# Patient Record
Sex: FEMALE | Race: WHITE
[De-identification: names, ages, dates, MRNs, and addresses within clinical notes are randomized per-mention and may not be internally consistent; named-entity substitution may affect disease eponyms.]

---

## 2022-03-19 PROBLEM — E78.00 ELEVATED LDL CHOLESTEROL LEVEL: Status: ACTIVE | Noted: 2020-09-29

## 2022-03-19 PROBLEM — E03.9 PRIMARY HYPOTHYROIDISM: Status: ACTIVE | Noted: 2020-09-28

## 2022-03-20 PROBLEM — Z85.068 HISTORY OF DUODENAL CANCER: Status: ACTIVE | Noted: 2021-04-07

## 2022-10-15 DIAGNOSIS — E03.9 PRIMARY HYPOTHYROIDISM: ICD-10-CM

## 2022-10-15 DIAGNOSIS — M81.0 AGE-RELATED OSTEOPOROSIS WITHOUT CURRENT PATHOLOGICAL FRACTURE: ICD-10-CM

## 2022-10-15 DIAGNOSIS — E78.00 PURE HYPERCHOLESTEROLEMIA, UNSPECIFIED: Primary | ICD-10-CM

## 2022-10-18 DIAGNOSIS — E03.9 HYPOTHYROIDISM, UNSPECIFIED: ICD-10-CM

## 2022-10-19 DIAGNOSIS — M81.0 AGE-RELATED OSTEOPOROSIS WITHOUT CURRENT PATHOLOGICAL FRACTURE: ICD-10-CM

## 2022-10-19 DIAGNOSIS — E78.00 PURE HYPERCHOLESTEROLEMIA, UNSPECIFIED: ICD-10-CM

## 2022-10-19 DIAGNOSIS — E03.9 PRIMARY HYPOTHYROIDISM: ICD-10-CM

## 2022-10-20 LAB
25(OH)D3 SERPL-MCNC: 72.5 NG/ML (ref 30–100)
ALBUMIN SERPL-MCNC: 4 G/DL (ref 3.2–4.6)
ALBUMIN/GLOB SERPL: 1.5 {RATIO} (ref 0.4–1.6)
ALP SERPL-CCNC: 53 U/L (ref 50–136)
ALT SERPL-CCNC: 31 U/L (ref 12–65)
ANION GAP SERPL CALC-SCNC: 6 MMOL/L (ref 2–11)
AST SERPL-CCNC: 21 U/L (ref 15–37)
BASOPHILS # BLD: 0 K/UL (ref 0–0.2)
BASOPHILS NFR BLD: 1 % (ref 0–2)
BILIRUB SERPL-MCNC: 0.4 MG/DL (ref 0.2–1.1)
BUN SERPL-MCNC: 8 MG/DL (ref 8–23)
CALCIUM SERPL-MCNC: 8.9 MG/DL (ref 8.3–10.4)
CHLORIDE SERPL-SCNC: 105 MMOL/L (ref 101–110)
CHOLEST SERPL-MCNC: 280 MG/DL
CO2 SERPL-SCNC: 26 MMOL/L (ref 21–32)
CREAT SERPL-MCNC: 0.4 MG/DL (ref 0.6–1)
DIFFERENTIAL METHOD BLD: ABNORMAL
EOSINOPHIL # BLD: 0.1 K/UL (ref 0–0.8)
EOSINOPHIL NFR BLD: 3 % (ref 0.5–7.8)
ERYTHROCYTE [DISTWIDTH] IN BLOOD BY AUTOMATED COUNT: 14.8 % (ref 11.9–14.6)
GLOBULIN SER CALC-MCNC: 2.6 G/DL (ref 2.8–4.5)
GLUCOSE SERPL-MCNC: 92 MG/DL (ref 65–100)
HCT VFR BLD AUTO: 35.2 % (ref 35.8–46.3)
HDLC SERPL-MCNC: 83 MG/DL (ref 40–60)
HDLC SERPL: 3.4 {RATIO}
HGB BLD-MCNC: 11 G/DL (ref 11.7–15.4)
IMM GRANULOCYTES # BLD AUTO: 0 K/UL (ref 0–0.5)
IMM GRANULOCYTES NFR BLD AUTO: 0 % (ref 0–5)
LDLC SERPL CALC-MCNC: 187.2 MG/DL
LYMPHOCYTES # BLD: 1.4 K/UL (ref 0.5–4.6)
LYMPHOCYTES NFR BLD: 52 % (ref 13–44)
MCH RBC QN AUTO: 30.1 PG (ref 26.1–32.9)
MCHC RBC AUTO-ENTMCNC: 31.3 G/DL (ref 31.4–35)
MCV RBC AUTO: 96.2 FL (ref 82–102)
MONOCYTES # BLD: 0.3 K/UL (ref 0.1–1.3)
MONOCYTES NFR BLD: 10 % (ref 4–12)
NEUTS SEG # BLD: 1 K/UL (ref 1.7–8.2)
NEUTS SEG NFR BLD: 35 % (ref 43–78)
NRBC # BLD: 0 K/UL (ref 0–0.2)
PLATELET # BLD AUTO: 289 K/UL (ref 150–450)
PMV BLD AUTO: 10 FL (ref 9.4–12.3)
POTASSIUM SERPL-SCNC: 4.2 MMOL/L (ref 3.5–5.1)
PROT SERPL-MCNC: 6.6 G/DL (ref 6.3–8.2)
RBC # BLD AUTO: 3.66 M/UL (ref 4.05–5.2)
SODIUM SERPL-SCNC: 137 MMOL/L (ref 133–143)
TRIGL SERPL-MCNC: 49 MG/DL (ref 35–150)
TSH, 3RD GENERATION: 2.58 UIU/ML (ref 0.36–3.74)
VLDLC SERPL CALC-MCNC: 9.8 MG/DL (ref 6–23)
WBC # BLD AUTO: 2.7 K/UL (ref 4.3–11.1)

## 2022-10-24 ENCOUNTER — OFFICE VISIT (OUTPATIENT)
Dept: FAMILY MEDICINE CLINIC | Facility: CLINIC | Age: 72
End: 2022-10-24
Payer: MEDICARE

## 2022-10-24 VITALS
HEIGHT: 65 IN | SYSTOLIC BLOOD PRESSURE: 108 MMHG | OXYGEN SATURATION: 98 % | BODY MASS INDEX: 23.49 KG/M2 | WEIGHT: 141 LBS | HEART RATE: 72 BPM | DIASTOLIC BLOOD PRESSURE: 60 MMHG | RESPIRATION RATE: 16 BRPM

## 2022-10-24 DIAGNOSIS — D70.9 NEUTROPENIA, UNSPECIFIED TYPE (HCC): ICD-10-CM

## 2022-10-24 DIAGNOSIS — E03.9 PRIMARY HYPOTHYROIDISM: ICD-10-CM

## 2022-10-24 DIAGNOSIS — E78.00 PURE HYPERCHOLESTEROLEMIA, UNSPECIFIED: Primary | ICD-10-CM

## 2022-10-24 PROCEDURE — G8399 PT W/DXA RESULTS DOCUMENT: HCPCS | Performed by: FAMILY MEDICINE

## 2022-10-24 PROCEDURE — G8420 CALC BMI NORM PARAMETERS: HCPCS | Performed by: FAMILY MEDICINE

## 2022-10-24 PROCEDURE — 99214 OFFICE O/P EST MOD 30 MIN: CPT | Performed by: FAMILY MEDICINE

## 2022-10-24 PROCEDURE — 1090F PRES/ABSN URINE INCON ASSESS: CPT | Performed by: FAMILY MEDICINE

## 2022-10-24 PROCEDURE — 3017F COLORECTAL CA SCREEN DOC REV: CPT | Performed by: FAMILY MEDICINE

## 2022-10-24 PROCEDURE — 1123F ACP DISCUSS/DSCN MKR DOCD: CPT | Performed by: FAMILY MEDICINE

## 2022-10-24 PROCEDURE — G8427 DOCREV CUR MEDS BY ELIG CLIN: HCPCS | Performed by: FAMILY MEDICINE

## 2022-10-24 PROCEDURE — 1036F TOBACCO NON-USER: CPT | Performed by: FAMILY MEDICINE

## 2022-10-24 PROCEDURE — G8484 FLU IMMUNIZE NO ADMIN: HCPCS | Performed by: FAMILY MEDICINE

## 2022-10-24 RX ORDER — AMOXICILLIN 250 MG
CAPSULE ORAL DAILY
COMMUNITY

## 2022-10-24 RX ORDER — BLACK COHOSH ROOT EXTRACT 80 MG
3 CAPSULE ORAL DAILY
COMMUNITY

## 2022-10-24 RX ORDER — LIOTHYRONINE SODIUM 5 UG/1
TABLET ORAL
Qty: 180 TABLET | Refills: 1 | Status: SHIPPED | OUTPATIENT
Start: 2022-10-24

## 2022-10-24 RX ORDER — LEVOTHYROXINE SODIUM 0.05 MG/1
50 TABLET ORAL
Qty: 90 TABLET | Refills: 1 | Status: SHIPPED | OUTPATIENT
Start: 2022-10-24

## 2022-10-24 RX ORDER — ASCORBIC ACID 500 MG
500 TABLET ORAL DAILY
COMMUNITY

## 2022-10-24 RX ORDER — LIOTHYRONINE SODIUM 5 UG/1
TABLET ORAL
Qty: 180 TABLET | Refills: 1 | OUTPATIENT
Start: 2022-10-24

## 2022-10-24 ASSESSMENT — ENCOUNTER SYMPTOMS
VOMITING: 0
NAUSEA: 0
SHORTNESS OF BREATH: 0
BLOOD IN STOOL: 0
ABDOMINAL PAIN: 0
COUGH: 0

## 2022-10-24 NOTE — PROGRESS NOTES
[facility],[address], DO  [address], [address] [facility] [address]  Ph No:  [phone]  Fax:  [phone]        Assessment/Plan:   Darlene Luke was seen today for thyroid problem and cholesterol problem. Diagnoses and all orders for this visit:    Pure hypercholesterolemia, unspecified  Uncontrolled. Advise following Mediterranean style diet as I am concerned that the high fat diet she is following is leading to the worsening cholesterol. Write her with information.  -     CBC with Auto Differential; Future  -     Comprehensive Metabolic Panel; Future  -     Lipid Panel; Future  -     TSH; Future    Primary hypothyroidism  TSH therapeutic. Continue liothyronine and levothyroxine doses. -     CBC with Auto Differential; Future  -     Comprehensive Metabolic Panel; Future  -     Lipid Panel; Future  -     TSH; Future  -     liothyronine (CYTOMEL) 5 MCG tablet; TAKE 2 TABLETS BY MOUTH EVERY DAY  -     levothyroxine (SYNTHROID) 50 MCG tablet; Take 1 tablet by mouth every morning (before breakfast)    Neutropenia, unspecified type ([address]  New problem. Continue to monitor. Her WBCs have been on the low end of normal but not this low in the recent past.  Continue to monitor trend. Discussed with patient.  -     CBC with Auto Differential; Future  -     Comprehensive Metabolic Panel; Future  -     Lipid Panel; Future  -     TSH; Future        Labs:  No results found for this visit on 10/24/22.     Orders Only on 10/19/2022   Component Date Value Ref Range Status    Vit D, 25-Hydroxy 10/19/2022 72.5  30.0 - 100.0 ng/mL Final    Sodium 10/19/2022 137  133 - 143 mmol/L Final    Potassium 10/19/2022 4.2  3.5 - 5.1 mmol/L Final    Chloride 10/19/2022 105  101 - 110 mmol/L Final    CO2 10/19/2022 26  21 - 32 mmol/L Final    Anion Gap 10/19/2022 6  2 - 11 mmol/L Final    Glucose 10/19/2022 92  65 - 100 mg/dL Final    BUN 10/19/2022 8  8 - 23 MG/DL Final    Creatinine 10/19/2022 0.40 (A)  0.6 - 1.0 MG/DL Final    Est, Glom Filt Rate 10/19/2022 >60  >60 ml/min/1.73m2 Final    Comment:   Pediatric calculator link: Orin.at. org/professionals/kdoqi/gfr_calculatorped    Effective Oct 3, 2022    These results are not intended for use in patients <25years of age. eGFR results are calculated without a race factor using  the 2021 CKD-EPI equation. Careful clinical correlation is recommended, particularly when comparing to results calculated using previous equations. The CKD-EPI equation is less accurate in patients with extremes of muscle mass, extra-renal metabolism of creatinine, excessive creatine ingestion, or following therapy that affects renal tubular secretion.       Calcium 10/19/2022 8.9  8.3 - 10.4 MG/DL Final    Total Bilirubin 10/19/2022 0.4  0.2 - 1.1 MG/DL Final    ALT 10/19/2022 31  12 - 65 U/L Final    AST 10/19/2022 21  15 - 37 U/L Final    Alk Phosphatase 10/19/2022 53  50 - 136 U/L Final    Total Protein 10/19/2022 6.6  6.3 - 8.2 g/dL Final    Albumin 10/19/2022 4.0  3.2 - 4.6 g/dL Final    Globulin 10/19/2022 2.6 (A)  2.8 - 4.5 g/dL Final    Albumin/Globulin Ratio 10/19/2022 1.5  0.4 - 1.6   Final    WBC 10/19/2022 2.7 (A)  4.3 - 11.1 K/uL Final    RBC 10/19/2022 3.66 (A)  4.05 - 5.2 M/uL Final    Hemoglobin 10/19/2022 11.0 (A)  11.7 - 15.4 g/dL Final    Hematocrit 10/19/2022 35.2 (A)  35.8 - 46.3 % Final    MCV 10/19/2022 96.2  82 - 102 FL Final    MCH 10/19/2022 30.1  26.1 - 32.9 PG Final    MCHC 10/19/2022 31.3 (A)  31.4 - 35.0 g/dL Final    RDW 10/19/2022 14.8 (A)  11.9 - 14.6 % Final    Platelets 42/52/4685 289  150 - 450 K/uL Final    MPV 10/19/2022 10.0  9.4 - 12.3 FL Final    nRBC 10/19/2022 0.00  0.0 - 0.2 K/uL Final    **Note: Absolute NRBC parameter is now reported with Hemogram**    Differential Type 10/19/2022 AUTOMATED    Final    Seg Neutrophils 10/19/2022 35 (A)  43 - 78 % Final    Lymphocytes 10/19/2022 52 (A)  13 - 44 % Final    Monocytes 10/19/2022 10  4.0 - 12.0 % Final    Eosinophils % 10/19/2022 3  0.5 - 7.8 % Final    Basophils 10/19/2022 1  0.0 - 2.0 % Final    Immature Granulocytes 10/19/2022 0  0.0 - 5.0 % Final    Segs Absolute 10/19/2022 1.0 (A)  1.7 - 8.2 K/UL Final    Absolute Lymph # 10/19/2022 1.4  0.5 - 4.6 K/UL Final    Absolute Mono # 10/19/2022 0.3  0.1 - 1.3 K/UL Final    Absolute Eos # 10/19/2022 0.1  0.0 - 0.8 K/UL Final    Basophils Absolute 10/19/2022 0.0  0.0 - 0.2 K/UL Final    Absolute Immature Granulocyte 10/19/2022 0.0  0.0 - 0.5 K/UL Final    Cholesterol, Total 10/19/2022 280 (A)  <200 MG/DL Final    Comment: Borderline High: 200-239 mg/dL  High: Greater than or equal to 240 mg/dL      Triglycerides 10/19/2022 49  35 - 150 MG/DL Final    Comment: Borderline High: 150-199 mg/dL, High: 200-499 mg/dL  Very High: Greater than or equal to 500 mg/dL      HDL 10/19/2022 83 (A)  40 - 60 MG/DL Final    LDL Calculated 10/19/2022 187.2 (A)  <100 MG/DL Final    Comment: Near Optimal: 100-129 mg/dL  Borderline High: 130-159, High: 160-189 mg/dL  Very High: Greater than or equal to 190 mg/dL      VLDL Cholesterol Calculated 10/19/2022 9.8  6.0 - 23.0 MG/DL Final    Chol/HDL Ratio 10/19/2022 3.4    Final    TSH, 3RD GENERATION 10/19/2022 2.580  0.358 - 3.740 uIU/mL Final           Della Trevizo is a 67 y.o. female who is seen for evaluation of   Chief Complaint   Patient presents with    Thyroid Problem    Cholesterol Problem       HPI:   She is here today to follow-up chronic issues. She has been following a high fat, low carb diet. She had labs prior to her appointment today. She is not interested in mammogram or cholesterol-lowering medications. She is tolerating her thyroid medications well and will need a refill. She not interested in any vaccines. She is looking forward to an upcoming trip to [place] for a friend's granddaughters wedding. She also recently had a good visit in [place] for family reunion.   She does note that she was not eating healthy on that trip. Review of Systems:  Review of Systems   Constitutional:  Negative for chills, fever and unexpected weight change. Respiratory:  Negative for cough and shortness of breath. Cardiovascular:  Negative for chest pain and leg swelling. Gastrointestinal:  Negative for abdominal pain, blood in stool, nausea and vomiting. Psychiatric/Behavioral:  The patient is not nervous/anxious. 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    S/P whipple    Lichen sclerosus     OAB (overactive bladder)     Osteoporosis     Primary hypothyroidism        Past Surgical History:   Procedure Laterality Date    HEMORRHOID SURGERY  2020    OTHER SURGICAL HISTORY  2010    whipple procedure        Current Outpatient Medications   Medication Sig Dispense Refill    Probiotic Product (PROBIOTIC-10 PO) Take by mouth      Cobalamin Combinations (B-12) 100-5000 MCG SUBL Place under the tongue daily      Omega 3-6-9 Fatty Acids (OMEGA 3-6-9 COMPLEX) CAPS Take 3 capsules by mouth daily      vitamin C (ASCORBIC ACID) 500 MG tablet Take 500 mg by mouth daily      liothyronine (CYTOMEL) 5 MCG tablet TAKE 2 TABLETS BY MOUTH EVERY  tablet 1    levothyroxine (SYNTHROID) 50 MCG tablet Take 1 tablet by mouth every morning (before breakfast) 90 tablet 1    LYCOPENE PO Take by mouth as needed      vitamin D3 (CHOLECALCIFEROL) 125 MCG (5000 UT) TABS tablet Take by mouth daily      Coenzyme Q10 10 MG CAPS Take by mouth      magnesium oxide (MAG-OX) 400 (240 Mg) MG tablet Take 400 mg by mouth daily      melatonin 3 MG TABS tablet Take by mouth      vitamin E 400 UNIT capsule Take by mouth daily       No current facility-administered medications for this visit.        Immunization History   Administered Date(s) Administered    Tdap (Boostrix, Adacel) 01/01/2011             Vitals:    Vitals:    10/24/22 1018   BP: 108/60   Site: Left Upper Arm   Position: Sitting Pulse: 72   Resp: 16   SpO2: 98%   Weight: 141 lb (64 kg)   Height: 5' 5\" (1.651 m)          Physical Exam:  Physical Exam  Vitals reviewed. Constitutional:       Appearance: Normal appearance. HENT:      Head: Normocephalic and atraumatic. Cardiovascular:      Rate and Rhythm: Normal rate and regular rhythm. Heart sounds: No murmur heard. Pulmonary:      Effort: Pulmonary effort is normal. No respiratory distress. Breath sounds: Normal breath sounds. No wheezing or rhonchi. Abdominal:      General: There is no distension. Palpations: There is no mass. Tenderness: There is no abdominal tenderness. There is no right CVA tenderness, left CVA tenderness, guarding or rebound. Hernia: No hernia is present. Musculoskeletal:      Cervical back: Neck supple. Right lower leg: No edema. Left lower leg: No edema. Lymphadenopathy:      Cervical: No cervical adenopathy. Skin:     General: Skin is warm and dry. Neurological:      Mental Status: She is alert. Psychiatric:         Mood and Affect: Mood normal.         Behavior: Behavior normal.           Bere Jones DO    This note was generated using Dragon voice recognition software.   There may be medical errors due to computer generated translation

## 2023-02-09 ENCOUNTER — OFFICE VISIT (OUTPATIENT)
Dept: FAMILY MEDICINE CLINIC | Facility: CLINIC | Age: 73
End: 2023-02-09
Payer: MEDICARE

## 2023-02-09 ENCOUNTER — TELEPHONE (OUTPATIENT)
Dept: FAMILY MEDICINE CLINIC | Facility: CLINIC | Age: 73
End: 2023-02-09

## 2023-02-09 VITALS
WEIGHT: 145.4 LBS | BODY MASS INDEX: 24.22 KG/M2 | TEMPERATURE: 98.3 F | DIASTOLIC BLOOD PRESSURE: 64 MMHG | OXYGEN SATURATION: 96 % | SYSTOLIC BLOOD PRESSURE: 116 MMHG | RESPIRATION RATE: 16 BRPM | HEART RATE: 71 BPM | HEIGHT: 65 IN

## 2023-02-09 DIAGNOSIS — J02.9 SORE THROAT: Primary | ICD-10-CM

## 2023-02-09 LAB
GROUP A STREP ANTIGEN, POC: NEGATIVE
VALID INTERNAL CONTROL, POC: YES

## 2023-02-09 PROCEDURE — G8399 PT W/DXA RESULTS DOCUMENT: HCPCS | Performed by: FAMILY MEDICINE

## 2023-02-09 PROCEDURE — 1090F PRES/ABSN URINE INCON ASSESS: CPT | Performed by: FAMILY MEDICINE

## 2023-02-09 PROCEDURE — G8420 CALC BMI NORM PARAMETERS: HCPCS | Performed by: FAMILY MEDICINE

## 2023-02-09 PROCEDURE — 87880 STREP A ASSAY W/OPTIC: CPT | Performed by: FAMILY MEDICINE

## 2023-02-09 PROCEDURE — 1123F ACP DISCUSS/DSCN MKR DOCD: CPT | Performed by: FAMILY MEDICINE

## 2023-02-09 PROCEDURE — 99213 OFFICE O/P EST LOW 20 MIN: CPT | Performed by: FAMILY MEDICINE

## 2023-02-09 PROCEDURE — 1036F TOBACCO NON-USER: CPT | Performed by: FAMILY MEDICINE

## 2023-02-09 PROCEDURE — G8427 DOCREV CUR MEDS BY ELIG CLIN: HCPCS | Performed by: FAMILY MEDICINE

## 2023-02-09 PROCEDURE — 3017F COLORECTAL CA SCREEN DOC REV: CPT | Performed by: FAMILY MEDICINE

## 2023-02-09 PROCEDURE — G8484 FLU IMMUNIZE NO ADMIN: HCPCS | Performed by: FAMILY MEDICINE

## 2023-02-09 ASSESSMENT — ENCOUNTER SYMPTOMS
DIARRHEA: 0
VOMITING: 0
SHORTNESS OF BREATH: 0
TROUBLE SWALLOWING: 1
COUGH: 0
SORE THROAT: 1
RHINORRHEA: 0
CONSTIPATION: 0
TROUBLE SWALLOWING: 0
SINUS PAIN: 0
VOICE CHANGE: 0
NAUSEA: 0
SINUS PRESSURE: 0

## 2023-02-09 NOTE — PROGRESS NOTES
[address]  [address] [facility] [address]  Ph No:  [phone]  Fax:  [phone]        Assessment/Plan:   Isela Chua was seen today for pharyngitis. Diagnoses and all orders for this visit:    Sore throat. Viral etiology. Rapid Strep test ordered just incase to rule out potential infection; awaiting results. Patient instructed to use magic mouthwash 4 times daily to help her tolerate the pain associated with her sore throat. Continue managing symptoms conservatively by drinking warm fluids and increasing overall fluid intake. If symptoms worsen/do not get better patient can contact [address] for further evaluation.  -     AMB POC RAPID STREP A  -     Magic Mouthwash (MIRACLE MOUTHWASH); Swish and spit 5 mLs 4 times daily as needed for Irritation      Labs:  Results for orders placed or performed in visit on 02/09/23   AMB POC RAPID STREP A   Result Value Ref Range    Valid Internal Control, POC yes     Group A Strep Antigen, POC Negative Negative       Orders Only on 10/19/2022   Component Date Value Ref Range Status    Vit D, 25-Hydroxy 10/19/2022 72.5  30.0 - 100.0 ng/mL Final    Sodium 10/19/2022 137  133 - 143 mmol/L Final    Potassium 10/19/2022 4.2  3.5 - 5.1 mmol/L Final    Chloride 10/19/2022 105  101 - 110 mmol/L Final    CO2 10/19/2022 26  21 - 32 mmol/L Final    Anion Gap 10/19/2022 6  2 - 11 mmol/L Final    Glucose 10/19/2022 92  65 - 100 mg/dL Final    BUN 10/19/2022 8  8 - 23 MG/DL Final    Creatinine 10/19/2022 0.40 (A)  0.6 - 1.0 MG/DL Final    Est, Glom Filt Rate 10/19/2022 >60  >60 ml/min/1.73m2 Final    Comment:   Pediatric calculator link: Orin.at. org/professionals/kdoqi/gfr_calculatorped    Effective Oct 3, 2022    These results are not intended for use in patients <25years of age. eGFR results are calculated without a race factor using  the 2021 CKD-EPI equation.  Careful clinical correlation is recommended, particularly when comparing to results calculated using previous equations. The CKD-EPI equation is less accurate in patients with extremes of muscle mass, extra-renal metabolism of creatinine, excessive creatine ingestion, or following therapy that affects renal tubular secretion.       Calcium 10/19/2022 8.9  8.3 - 10.4 MG/DL Final    Total Bilirubin 10/19/2022 0.4  0.2 - 1.1 MG/DL Final    ALT 10/19/2022 31  12 - 65 U/L Final    AST 10/19/2022 21  15 - 37 U/L Final    Alk Phosphatase 10/19/2022 53  50 - 136 U/L Final    Total Protein 10/19/2022 6.6  6.3 - 8.2 g/dL Final    Albumin 10/19/2022 4.0  3.2 - 4.6 g/dL Final    Globulin 10/19/2022 2.6 (A)  2.8 - 4.5 g/dL Final    Albumin/Globulin Ratio 10/19/2022 1.5  0.4 - 1.6   Final    WBC 10/19/2022 2.7 (A)  4.3 - 11.1 K/uL Final    RBC 10/19/2022 3.66 (A)  4.05 - 5.2 M/uL Final    Hemoglobin 10/19/2022 11.0 (A)  11.7 - 15.4 g/dL Final    Hematocrit 10/19/2022 35.2 (A)  35.8 - 46.3 % Final    MCV 10/19/2022 96.2  82 - 102 FL Final    MCH 10/19/2022 30.1  26.1 - 32.9 PG Final    MCHC 10/19/2022 31.3 (A)  31.4 - 35.0 g/dL Final    RDW 10/19/2022 14.8 (A)  11.9 - 14.6 % Final    Platelets 69/03/1225 289  150 - 450 K/uL Final    MPV 10/19/2022 10.0  9.4 - 12.3 FL Final    nRBC 10/19/2022 0.00  0.0 - 0.2 K/uL Final    **Note: Absolute NRBC parameter is now reported with Hemogram**    Differential Type 10/19/2022 AUTOMATED    Final    Seg Neutrophils 10/19/2022 35 (A)  43 - 78 % Final    Lymphocytes 10/19/2022 52 (A)  13 - 44 % Final    Monocytes 10/19/2022 10  4.0 - 12.0 % Final    Eosinophils % 10/19/2022 3  0.5 - 7.8 % Final    Basophils 10/19/2022 1  0.0 - 2.0 % Final    Immature Granulocytes 10/19/2022 0  0.0 - 5.0 % Final    Segs Absolute 10/19/2022 1.0 (A)  1.7 - 8.2 K/UL Final    Absolute Lymph # 10/19/2022 1.4  0.5 - 4.6 K/UL Final    Absolute Mono # 10/19/2022 0.3  0.1 - 1.3 K/UL Final    Absolute Eos # 10/19/2022 0.1  0.0 - 0.8 K/UL Final    Basophils Absolute 10/19/2022 0.0  0.0 - 0.2 K/UL Final    Absolute Immature Granulocyte 10/19/2022 0.0  0.0 - 0.5 K/UL Final    Cholesterol, Total 10/19/2022 280 (A)  <200 MG/DL Final    Comment: Borderline High: 200-239 mg/dL  High: Greater than or equal to 240 mg/dL      Triglycerides 10/19/2022 49  35 - 150 MG/DL Final    Comment: Borderline High: 150-199 mg/dL, High: 200-499 mg/dL  Very High: Greater than or equal to 500 mg/dL      HDL 10/19/2022 83 (A)  40 - 60 MG/DL Final    LDL Calculated 10/19/2022 187.2 (A)  <100 MG/DL Final    Comment: Near Optimal: 100-129 mg/dL  Borderline High: 130-159, High: 160-189 mg/dL  Very High: Greater than or equal to 190 mg/dL      VLDL Cholesterol Calculated 10/19/2022 9.8  6.0 - 23.0 MG/DL Final    Chol/HDL Ratio 10/19/2022 3.4    Final    TSH, 3RD GENERATION 10/19/2022 2.580  0.358 - 3.740 uIU/mL Final           Beena Altamirano is a 67 y.o. female who is seen for evaluation of   Chief Complaint   Patient presents with    Pharyngitis     Sore throat for about 3 days. Pt denies fever, body aches, fatigue, N/D/V, congestion. HPI:   Patient is a 67 yr old female who presents today with complaints of sore throat. She reports the symptoms came on rapidly 3 days ago and that she has no sick contacts. She states the soreness is generalized and not localized to a particular side of her throat. She has not taken a COVID test or a flu test at home. She mentions tenderness under her jaw bone, gum swelling/tenderness, and that her tongue is sensitive to hot liquids which causes a stinging sensation. She has tried colloidal silver, black elderberry syrup, and gargling with warm salt water in order to combat her sore throat. All of these methods only gave her temporary relief. Review of Systems:  Review of Systems   Constitutional:  Negative for chills, fatigue and fever. HENT:  Positive for sore throat and trouble swallowing.  Negative for congestion, postnasal drip, rhinorrhea, sinus pressure and sinus pain. Respiratory:  Negative for cough and shortness of breath. Cardiovascular:  Negative for chest pain and palpitations. Gastrointestinal:  Negative for constipation, diarrhea, nausea and vomiting. Neurological:  Negative for light-headedness and headaches. 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    S/P whipple    Lichen sclerosus     OAB (overactive bladder)     Osteoporosis     Primary hypothyroidism        Past Surgical History:   Procedure Laterality Date    HEMORRHOID SURGERY  2020    OTHER SURGICAL HISTORY  2010    whipple procedure        Current Outpatient Medications   Medication Sig Dispense Refill    Magic Mouthwash (MIRACLE MOUTHWASH) Swish and spit 5 mLs 4 times daily as needed for Irritation 100 mL 0    Probiotic Product (PROBIOTIC-10 PO) Take by mouth      Cobalamin Combinations (B-12) 100-5000 MCG SUBL Place under the tongue daily      Omega 3-6-9 Fatty Acids (OMEGA 3-6-9 COMPLEX) CAPS Take 3 capsules by mouth daily      vitamin C (ASCORBIC ACID) 500 MG tablet Take 500 mg by mouth daily      liothyronine (CYTOMEL) 5 MCG tablet TAKE 2 TABLETS BY MOUTH EVERY  tablet 1    levothyroxine (SYNTHROID) 50 MCG tablet Take 1 tablet by mouth every morning (before breakfast) 90 tablet 1    LYCOPENE PO Take by mouth as needed      vitamin D3 (CHOLECALCIFEROL) 125 MCG (5000 UT) TABS tablet Take by mouth daily      Coenzyme Q10 10 MG CAPS Take by mouth      magnesium oxide (MAG-OX) 400 (240 Mg) MG tablet Take 400 mg by mouth daily      melatonin 3 MG TABS tablet Take by mouth      vitamin E 400 UNIT capsule Take by mouth daily       No current facility-administered medications for this visit.        Immunization History   Administered Date(s) Administered    Tdap (Boostrix, Adacel) 01/01/2011             Vitals:    Vitals:    02/09/23 1147   BP: 116/64   Site: Left Upper Arm   Position: Sitting   Pulse: 71   Resp: 16 Temp: 98.3 °F (36.8 °C)   TempSrc: Oral   SpO2: 96%   Weight: 145 lb 6.4 oz (66 kg)   Height: 5' 5\" (1.651 m)          Physical Exam:  Physical Exam  Constitutional:       Appearance: Normal appearance. She is normal weight. She is not ill-appearing. HENT:      Right Ear: Tympanic membrane, ear canal and external ear normal.      Left Ear: Tympanic membrane, ear canal and external ear normal.      Nose: Nose normal. No congestion or rhinorrhea. Mouth/Throat:      Mouth: Mucous membranes are moist.      Pharynx: Posterior oropharyngeal erythema present. No oropharyngeal exudate. Cardiovascular:      Rate and Rhythm: Normal rate and regular rhythm. Pulses: Normal pulses. Heart sounds: Normal heart sounds. No murmur heard. No friction rub. No gallop. Pulmonary:      Breath sounds: Normal breath sounds. No stridor. No wheezing, rhonchi or rales. Abdominal:      Palpations: Abdomen is soft. Tenderness: There is no abdominal tenderness. There is no guarding. Musculoskeletal:         General: Tenderness (Bilateral Lower Extremity) present. Cervical back: Tenderness present. Lymphadenopathy:      Cervical: Cervical adenopathy present. Neurological:      Mental Status: She is alert. Benjy Talbot    This note was generated using Dragon voice recognition software.   There may be medical errors due to computer generated translation

## 2023-02-09 NOTE — PROGRESS NOTES
[address] [facility],[address], DO  [address] [facility] [address]  Ph No:  [phone]  Fax:  [phone]        Assessment/Plan:   Cortez Stack was seen today for pharyngitis. Diagnoses and all orders for this visit:    Sore throat. Viral etiology. Rapid strep negative. Exam inconsistent with strep throat. Prescribing Magic mouthwash to use for pain. -     AMB POC RAPID STREP A  -     Magic Mouthwash (MIRACLE MOUTHWASH); Swish and spit 5 mLs 4 times daily as needed for Irritation      Labs:  Results for orders placed or performed in visit on 02/09/23   AMB POC RAPID STREP A   Result Value Ref Range    Valid Internal Control, POC yes     Group A Strep Antigen, POC Negative Negative               Katelyn Diamond is a 67 y.o. female who is seen for evaluation of   Chief Complaint   Patient presents with    Pharyngitis     Sore throat for about 3 days. Pt denies fever, body aches, fatigue, N/D/V, congestion. HPI:  She is here today with 3 days of sore throat. No neck pain. She does not feel ill. No sick contacts. She feels that her gums are tender. She is up-to-date on her dental exam.  She also feels that her tongue is sensitive. She has history of taking iron and B12. Iron was stopped previously due to high iron level. She has tried colloidal silver, black elderberry syrup, and gargling with warm salt water in order to combat her sore throat. All of these methods only gave her temporary relief. Review of Systems:  Review of Systems   Constitutional:  Negative for chills, fatigue and fever. HENT:  Positive for sore throat. Negative for congestion, postnasal drip, rhinorrhea, sinus pressure, sinus pain, trouble swallowing and voice change. Respiratory:  Negative for cough and shortness of breath. Cardiovascular:  Negative for chest pain and palpitations. Gastrointestinal:  Negative for constipation, diarrhea, nausea and vomiting.    Neurological: Negative for light-headedness and headaches. 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    S/P whipple    Lichen sclerosus     OAB (overactive bladder)     Osteoporosis     Primary hypothyroidism        Past Surgical History:   Procedure Laterality Date    HEMORRHOID SURGERY  2020    OTHER SURGICAL HISTORY  2010    whipple procedure        Current Outpatient Medications   Medication Sig Dispense Refill    Probiotic Product (PROBIOTIC-10 PO) Take by mouth      Cobalamin Combinations (B-12) 100-5000 MCG SUBL Place under the tongue daily      Omega 3-6-9 Fatty Acids (OMEGA 3-6-9 COMPLEX) CAPS Take 3 capsules by mouth daily      vitamin C (ASCORBIC ACID) 500 MG tablet Take 500 mg by mouth daily      liothyronine (CYTOMEL) 5 MCG tablet TAKE 2 TABLETS BY MOUTH EVERY  tablet 1    levothyroxine (SYNTHROID) 50 MCG tablet Take 1 tablet by mouth every morning (before breakfast) 90 tablet 1    LYCOPENE PO Take by mouth as needed      vitamin D3 (CHOLECALCIFEROL) 125 MCG (5000 UT) TABS tablet Take by mouth daily      Coenzyme Q10 10 MG CAPS Take by mouth      magnesium oxide (MAG-OX) 400 (240 Mg) MG tablet Take 400 mg by mouth daily      melatonin 3 MG TABS tablet Take by mouth      vitamin E 400 UNIT capsule Take by mouth daily      Magic Mouthwash (MIRACLE MOUTHWASH) Swish and spit 5 mLs 4 times daily as needed for Irritation 1 Part viscous lidocaine 2%, 1 Part Maalox, 1 Part diphenhydramine 12.5 mg per 5 ml elixir. Shake well before using. 100 mL 0     No current facility-administered medications for this visit.        Immunization History   Administered Date(s) Administered    Tdap (Boostrix, Adacel) 01/01/2011             Vitals:    Vitals:    02/09/23 1147   BP: 116/64   Site: Left Upper Arm   Position: Sitting   Pulse: 71   Resp: 16   Temp: 98.3 °F (36.8 °C)   TempSrc: Oral   SpO2: 96%   Weight: 145 lb 6.4 oz (66 kg)   Height: 5' 5\" (1.651 m)          Physical Exam:  Physical Exam  Constitutional:       Appearance: Normal appearance. She is normal weight. She is not ill-appearing. HENT:      Right Ear: Tympanic membrane, ear canal and external ear normal.      Left Ear: Tympanic membrane, ear canal and external ear normal.      Nose: Nose normal. No congestion or rhinorrhea. Mouth/Throat:      Mouth: Mucous membranes are moist.      Pharynx: Uvula midline. No oropharyngeal exudate. Comments: Erythema of the posterior hard palate and soft palate  Cardiovascular:      Rate and Rhythm: Normal rate and regular rhythm. Pulses: Normal pulses. Heart sounds: Normal heart sounds. No murmur heard. No friction rub. No gallop. Pulmonary:      Breath sounds: Normal breath sounds. No stridor. No wheezing, rhonchi or rales. Abdominal:      Palpations: Abdomen is soft. Tenderness: There is no abdominal tenderness. There is no guarding. Lymphadenopathy:      Cervical: Cervical adenopathy present. Neurological:      Mental Status: She is alert. Devi Myers DO    This note was generated using Dragon voice recognition software.   There may be medical errors due to computer generated translation

## 2023-02-09 NOTE — TELEPHONE ENCOUNTER
Pt called and stated she is having sore throat and swollen glands for about 3 days. Pt denies fever, cough, congestion, body aches, N/V/D. Pt is requesting an appointment.

## 2023-04-19 DIAGNOSIS — E03.9 PRIMARY HYPOTHYROIDISM: ICD-10-CM

## 2023-04-19 RX ORDER — LIOTHYRONINE SODIUM 5 UG/1
TABLET ORAL
Qty: 60 TABLET | Refills: 0 | Status: SHIPPED | OUTPATIENT
Start: 2023-04-19 | End: 2023-05-11 | Stop reason: SDUPTHER

## 2023-05-08 ENCOUNTER — NURSE ONLY (OUTPATIENT)
Dept: FAMILY MEDICINE CLINIC | Facility: CLINIC | Age: 73
End: 2023-05-08

## 2023-05-08 DIAGNOSIS — E78.00 PURE HYPERCHOLESTEROLEMIA, UNSPECIFIED: ICD-10-CM

## 2023-05-08 DIAGNOSIS — D70.9 NEUTROPENIA, UNSPECIFIED TYPE (HCC): ICD-10-CM

## 2023-05-08 DIAGNOSIS — E03.9 PRIMARY HYPOTHYROIDISM: ICD-10-CM

## 2023-05-08 LAB
BASOPHILS # BLD: 0 K/UL (ref 0–0.2)
BASOPHILS NFR BLD: 1 % (ref 0–2)
DIFFERENTIAL METHOD BLD: ABNORMAL
EOSINOPHIL # BLD: 0.1 K/UL (ref 0–0.8)
EOSINOPHIL NFR BLD: 2 % (ref 0.5–7.8)
ERYTHROCYTE [DISTWIDTH] IN BLOOD BY AUTOMATED COUNT: 14.7 % (ref 11.9–14.6)
HCT VFR BLD AUTO: 35.3 % (ref 35.8–46.3)
HGB BLD-MCNC: 11.1 G/DL (ref 11.7–15.4)
IMM GRANULOCYTES # BLD AUTO: 0 K/UL (ref 0–0.5)
IMM GRANULOCYTES NFR BLD AUTO: 0 % (ref 0–5)
LYMPHOCYTES # BLD: 1.6 K/UL (ref 0.5–4.6)
LYMPHOCYTES NFR BLD: 47 % (ref 13–44)
MCH RBC QN AUTO: 28.7 PG (ref 26.1–32.9)
MCHC RBC AUTO-ENTMCNC: 31.4 G/DL (ref 31.4–35)
MCV RBC AUTO: 91.2 FL (ref 82–102)
MONOCYTES # BLD: 0.4 K/UL (ref 0.1–1.3)
MONOCYTES NFR BLD: 11 % (ref 4–12)
NEUTS SEG # BLD: 1.3 K/UL (ref 1.7–8.2)
NEUTS SEG NFR BLD: 39 % (ref 43–78)
NRBC # BLD: 0 K/UL (ref 0–0.2)
PLATELET # BLD AUTO: 280 K/UL (ref 150–450)
PMV BLD AUTO: 10 FL (ref 9.4–12.3)
RBC # BLD AUTO: 3.87 M/UL (ref 4.05–5.2)
WBC # BLD AUTO: 3.4 K/UL (ref 4.3–11.1)

## 2023-05-09 LAB
ALBUMIN SERPL-MCNC: 4 G/DL (ref 3.2–4.6)
ALBUMIN/GLOB SERPL: 1.5 (ref 0.4–1.6)
ALP SERPL-CCNC: 58 U/L (ref 50–136)
ALT SERPL-CCNC: 28 U/L (ref 12–65)
ANION GAP SERPL CALC-SCNC: 6 MMOL/L (ref 2–11)
AST SERPL-CCNC: 19 U/L (ref 15–37)
BILIRUB SERPL-MCNC: 0.4 MG/DL (ref 0.2–1.1)
BUN SERPL-MCNC: 10 MG/DL (ref 8–23)
CALCIUM SERPL-MCNC: 9.5 MG/DL (ref 8.3–10.4)
CHLORIDE SERPL-SCNC: 103 MMOL/L (ref 101–110)
CHOLEST SERPL-MCNC: 267 MG/DL
CO2 SERPL-SCNC: 26 MMOL/L (ref 21–32)
CREAT SERPL-MCNC: 0.5 MG/DL (ref 0.6–1)
GLOBULIN SER CALC-MCNC: 2.6 G/DL (ref 2.8–4.5)
GLUCOSE SERPL-MCNC: 87 MG/DL (ref 65–100)
HDLC SERPL-MCNC: 90 MG/DL (ref 40–60)
HDLC SERPL: 3
LDLC SERPL CALC-MCNC: 165.6 MG/DL
POTASSIUM SERPL-SCNC: 4.2 MMOL/L (ref 3.5–5.1)
PROT SERPL-MCNC: 6.6 G/DL (ref 6.3–8.2)
SODIUM SERPL-SCNC: 135 MMOL/L (ref 133–143)
TRIGL SERPL-MCNC: 57 MG/DL (ref 35–150)
TSH, 3RD GENERATION: 1.39 UIU/ML (ref 0.36–3.74)
VLDLC SERPL CALC-MCNC: 11.4 MG/DL (ref 6–23)

## 2023-05-10 SDOH — ECONOMIC STABILITY: FOOD INSECURITY: WITHIN THE PAST 12 MONTHS, THE FOOD YOU BOUGHT JUST DIDN'T LAST AND YOU DIDN'T HAVE MONEY TO GET MORE.: NEVER TRUE

## 2023-05-10 SDOH — ECONOMIC STABILITY: TRANSPORTATION INSECURITY
IN THE PAST 12 MONTHS, HAS LACK OF TRANSPORTATION KEPT YOU FROM MEETINGS, WORK, OR FROM GETTING THINGS NEEDED FOR DAILY LIVING?: NO

## 2023-05-10 SDOH — ECONOMIC STABILITY: INCOME INSECURITY: HOW HARD IS IT FOR YOU TO PAY FOR THE VERY BASICS LIKE FOOD, HOUSING, MEDICAL CARE, AND HEATING?: NOT HARD AT ALL

## 2023-05-10 SDOH — ECONOMIC STABILITY: FOOD INSECURITY: WITHIN THE PAST 12 MONTHS, YOU WORRIED THAT YOUR FOOD WOULD RUN OUT BEFORE YOU GOT MONEY TO BUY MORE.: NEVER TRUE

## 2023-05-10 SDOH — ECONOMIC STABILITY: HOUSING INSECURITY
IN THE LAST 12 MONTHS, WAS THERE A TIME WHEN YOU DID NOT HAVE A STEADY PLACE TO SLEEP OR SLEPT IN A SHELTER (INCLUDING NOW)?: NO

## 2023-05-11 ENCOUNTER — OFFICE VISIT (OUTPATIENT)
Dept: FAMILY MEDICINE CLINIC | Facility: CLINIC | Age: 73
End: 2023-05-11
Payer: MEDICARE

## 2023-05-11 VITALS
SYSTOLIC BLOOD PRESSURE: 112 MMHG | HEIGHT: 65 IN | WEIGHT: 148 LBS | RESPIRATION RATE: 16 BRPM | HEART RATE: 74 BPM | OXYGEN SATURATION: 97 % | BODY MASS INDEX: 24.66 KG/M2 | DIASTOLIC BLOOD PRESSURE: 64 MMHG

## 2023-05-11 DIAGNOSIS — Z00.00 MEDICARE ANNUAL WELLNESS VISIT, SUBSEQUENT: Primary | ICD-10-CM

## 2023-05-11 DIAGNOSIS — E78.00 PURE HYPERCHOLESTEROLEMIA, UNSPECIFIED: ICD-10-CM

## 2023-05-11 DIAGNOSIS — D70.9 NEUTROPENIA, UNSPECIFIED TYPE (HCC): ICD-10-CM

## 2023-05-11 DIAGNOSIS — E03.9 PRIMARY HYPOTHYROIDISM: ICD-10-CM

## 2023-05-11 DIAGNOSIS — D64.9 ANEMIA, UNSPECIFIED TYPE: ICD-10-CM

## 2023-05-11 PROCEDURE — 3017F COLORECTAL CA SCREEN DOC REV: CPT | Performed by: FAMILY MEDICINE

## 2023-05-11 PROCEDURE — G0439 PPPS, SUBSEQ VISIT: HCPCS | Performed by: FAMILY MEDICINE

## 2023-05-11 PROCEDURE — 1123F ACP DISCUSS/DSCN MKR DOCD: CPT | Performed by: FAMILY MEDICINE

## 2023-05-11 RX ORDER — LEVOTHYROXINE SODIUM 0.05 MG/1
50 TABLET ORAL
Qty: 90 TABLET | Refills: 1 | Status: SHIPPED | OUTPATIENT
Start: 2023-05-11

## 2023-05-11 RX ORDER — LIOTHYRONINE SODIUM 5 UG/1
10 TABLET ORAL DAILY
Qty: 180 TABLET | Refills: 1 | Status: SHIPPED | OUTPATIENT
Start: 2023-05-11

## 2023-05-11 ASSESSMENT — ENCOUNTER SYMPTOMS
NAUSEA: 0
BLOOD IN STOOL: 0
SHORTNESS OF BREATH: 0
ABDOMINAL PAIN: 0
VOMITING: 0
COUGH: 0

## 2023-05-11 ASSESSMENT — PATIENT HEALTH QUESTIONNAIRE - PHQ9
2. FEELING DOWN, DEPRESSED OR HOPELESS: 0
SUM OF ALL RESPONSES TO PHQ QUESTIONS 1-9: 0
SUM OF ALL RESPONSES TO PHQ QUESTIONS 1-9: 0
1. LITTLE INTEREST OR PLEASURE IN DOING THINGS: 0
SUM OF ALL RESPONSES TO PHQ QUESTIONS 1-9: 0
SUM OF ALL RESPONSES TO PHQ QUESTIONS 1-9: 0
SUM OF ALL RESPONSES TO PHQ9 QUESTIONS 1 & 2: 0

## 2023-05-11 ASSESSMENT — LIFESTYLE VARIABLES
HOW MANY STANDARD DRINKS CONTAINING ALCOHOL DO YOU HAVE ON A TYPICAL DAY: 1 OR 2
HOW OFTEN DO YOU HAVE A DRINK CONTAINING ALCOHOL: 2-3 TIMES A WEEK

## 2023-05-11 NOTE — PROGRESS NOTES
Medicare Annual Wellness Visit    28 Mp Kat is here for Thyroid Problem, Cholesterol Problem, and Medicare AWV    Assessment & Plan   Medicare annual wellness visit, subsequent      Recommendations for Preventive Services Due: see orders and patient instructions/AVS.  Recommended screening schedule for the next 5-10 years is provided to the patient in written form: see Patient Instructions/AVS.     No follow-ups on file. Subjective       Patient's complete Health Risk Assessment and screening values have been reviewed and are found in Flowsheets. The following problems were reviewed today and where indicated follow up appointments were made and/or referrals ordered. Positive Risk Factor Screenings with Interventions:                                       Objective   Vitals:    05/11/23 1520   BP: 112/64   Site: Left Upper Arm   Position: Sitting   Pulse: 74   Resp: 16   SpO2: 97%   Weight: 148 lb (67.1 kg)   Height: 5' 5\" (1.651 m)      Body mass index is 24.63 kg/m². No Known Allergies  Prior to Visit Medications    Medication Sig Taking?  Authorizing Provider   liothyronine (CYTOMEL) 5 MCG tablet TAKE 2 TABLETS BY MOUTH EVERY DAY Yes Ishan Banerjee DO   Probiotic Product (PROBIOTIC-10 PO) Take by mouth Yes Historical Provider, MD   Cobalamin Combinations (B-12) 100-5000 MCG SUBL Place under the tongue daily Yes Historical Provider, MD   Omega 3-6-9 Fatty Acids (OMEGA 3-6-9 COMPLEX) CAPS Take 3 capsules by mouth daily Yes Historical Provider, MD   vitamin C (ASCORBIC ACID) 500 MG tablet Take 1 tablet by mouth daily Yes Historical Provider, MD   levothyroxine (SYNTHROID) 50 MCG tablet Take 1 tablet by mouth every morning (before breakfast) Yes Ishan Banerjee DO   LYCOPENE PO Take by mouth as needed Yes Ar Automatic Reconciliation   vitamin D3 (CHOLECALCIFEROL) 125 MCG (5000 UT) TABS tablet Take by mouth daily Yes Ar Automatic Reconciliation   Coenzyme Q10 10 MG CAPS Take by

## 2023-05-11 NOTE — PROGRESS NOTES
[address] [facility],[address], DO  [address], [address] [facility] [address]  Ph No:  [phone]  Fax:  [phone]        Assessment/Plan:   Alma Fritz was seen today for thyroid problem, cholesterol problem and medicare awv. Diagnoses and all orders for this visit:    Medicare annual wellness visit, subsequent    Primary hypothyroidism  TSH therapeutic. Reviewed below labs with patient. -     liothyronine (CYTOMEL) 5 MCG tablet; Take 2 tablets by mouth daily  -     levothyroxine (SYNTHROID) 50 MCG tablet; Take 1 tablet by mouth every morning (before breakfast)  -     Comprehensive Metabolic Panel; Future  -     Lipid Panel; Future  -     TSH; Future    Neutropenia, unspecified type ([address]  Stable. Her white blood cell count has remained around 3.4-4.5 for years  -     Comprehensive Metabolic Panel; Future  -     Lipid Panel; Future  -     TSH; Future    Pure hypercholesterolemia, unspecified  Improved considerably with lifestyle changes. -     Comprehensive Metabolic Panel; Future  -     Lipid Panel; Future  -     TSH; Future    Anemia, unspecified type  Hemoglobin stable. Has been 11.1 up to 11.6 for couple years now. Normochromic. Assess iron levels and B12 at follow-up. -     CBC with Auto Differential; Future  -     Ferritin; Future  -     Transferrin Saturation; Future  -     Vitamin B12; Future  -     Folate; Future        Labs:  No results found for this visit on 05/11/23.     Nurse Only on 05/08/2023   Component Date Value Ref Range Status    TSH, 3RD GENERATION 05/08/2023 1.390  0.358 - 3.740 uIU/mL Final    Cholesterol, Total 05/08/2023 267 (H)  <200 MG/DL Final    Comment: Borderline High: 200-239 mg/dL  High: Greater than or equal to 240 mg/dL      Triglycerides 05/08/2023 57  35 - 150 MG/DL Final    Comment: Borderline High: 150-199 mg/dL, High: 200-499 mg/dL  Very High: Greater than or equal to 500 mg/dL      HDL 05/08/2023 90 (H)  40 - 60 MG/DL Final    LDL Calculated

## 2023-07-06 ENCOUNTER — TELEPHONE (OUTPATIENT)
Dept: FAMILY MEDICINE CLINIC | Facility: CLINIC | Age: 73
End: 2023-07-06

## 2023-07-06 NOTE — TELEPHONE ENCOUNTER
Patient was transferred from [facility] is having sore throat and sore mouth again since yesterday.  Patient has no other symptoms and she stated this was the same thing that happened in February,

## 2023-07-20 ENCOUNTER — OFFICE VISIT (OUTPATIENT)
Dept: FAMILY MEDICINE CLINIC | Facility: CLINIC | Age: 73
End: 2023-07-20
Payer: MEDICARE

## 2023-07-20 VITALS
RESPIRATION RATE: 16 BRPM | HEART RATE: 77 BPM | HEIGHT: 65 IN | WEIGHT: 146.2 LBS | OXYGEN SATURATION: 97 % | SYSTOLIC BLOOD PRESSURE: 116 MMHG | BODY MASS INDEX: 24.36 KG/M2 | DIASTOLIC BLOOD PRESSURE: 66 MMHG

## 2023-07-20 DIAGNOSIS — S86.899A ANTERIOR SHIN SPLINTS: ICD-10-CM

## 2023-07-20 DIAGNOSIS — R63.5 WEIGHT GAIN: ICD-10-CM

## 2023-07-20 DIAGNOSIS — K13.79 MOUTH SORES: ICD-10-CM

## 2023-07-20 DIAGNOSIS — B07.0 PLANTAR WART, RIGHT FOOT: Primary | ICD-10-CM

## 2023-07-20 PROCEDURE — G8427 DOCREV CUR MEDS BY ELIG CLIN: HCPCS | Performed by: FAMILY MEDICINE

## 2023-07-20 PROCEDURE — 1036F TOBACCO NON-USER: CPT | Performed by: FAMILY MEDICINE

## 2023-07-20 PROCEDURE — G8420 CALC BMI NORM PARAMETERS: HCPCS | Performed by: FAMILY MEDICINE

## 2023-07-20 PROCEDURE — 17110 DESTRUCTION B9 LES UP TO 14: CPT | Performed by: FAMILY MEDICINE

## 2023-07-20 PROCEDURE — 1090F PRES/ABSN URINE INCON ASSESS: CPT | Performed by: FAMILY MEDICINE

## 2023-07-20 PROCEDURE — 1123F ACP DISCUSS/DSCN MKR DOCD: CPT | Performed by: FAMILY MEDICINE

## 2023-07-20 PROCEDURE — 3017F COLORECTAL CA SCREEN DOC REV: CPT | Performed by: FAMILY MEDICINE

## 2023-07-20 PROCEDURE — 99213 OFFICE O/P EST LOW 20 MIN: CPT | Performed by: FAMILY MEDICINE

## 2023-07-20 PROCEDURE — G8399 PT W/DXA RESULTS DOCUMENT: HCPCS | Performed by: FAMILY MEDICINE

## 2023-07-20 RX ORDER — SUMATRIPTAN 50 MG/1
50 TABLET, FILM COATED ORAL
COMMUNITY

## 2023-07-20 ASSESSMENT — ENCOUNTER SYMPTOMS
ABDOMINAL PAIN: 0
NAUSEA: 0
VOMITING: 0
BLOOD IN STOOL: 0
SHORTNESS OF BREATH: 0
COUGH: 0

## 2023-07-20 NOTE — PROGRESS NOTES
[address] Arnol Castellon, DO  [address], [address] [facility],[address]  Ph No:  [phone]  Fax:  [phone]        Assessment/Plan:   Darien Dixon was seen today for other. Diagnoses and all orders for this visit:    Plantar wart, right foot  New problem. Verbal consent obtained for in office treatment with liquid nitrogen. Liquid nitrogen applied to 2 different lesions on the right sole of her foot. Patient tolerated procedure. Bacitracin and bandage applied. Postprocedure care discussed with patient including over-the-counter treatment and continuing with pumice stone. Return to clinic in 4 weeks unless resolved. -     Wart destruction    Anterior shin splints   New problem continue with topical TheraCream.  Advised oral Aleve as needed. Advised icing her shins and stretching. She declines referral to PT at this point. Advised switching from sandals to well support thick soled tennis shoes. Weight gain  She has gained about 10 pounds in last 6 months. Advise eating more vegetables, fruits, less meat and dairy to help regulate her weight. Mouth sores  None noted on exam today. She has had 1 episode where she was seen here, 1 episode where she was seen at the urgent care. Await urgent care records. If this continues to be a recurrent problem may need referral to ENT. Advised patient to also consider discussing with her dentist.  She denies any heartburn symptoms. Tamie Carranza is a 67 y.o. female who is seen for evaluation of   Chief Complaint   Patient presents with    Other     Pt stated having shin splints  Has a callus on right foot, causes pain. Pt stated having weight gain, wants to know can this be from hormones. 7/7/23, went to [facility] for mouth soreness, strep and covid was negative. Given mouth wash. HPI:   The last 1-2 months she has noted pain on the right side of her foot.   She states it is a callus that is very

## 2023-08-25 ENCOUNTER — OFFICE VISIT (OUTPATIENT)
Dept: FAMILY MEDICINE CLINIC | Facility: CLINIC | Age: 73
End: 2023-08-25

## 2023-08-25 VITALS
SYSTOLIC BLOOD PRESSURE: 114 MMHG | DIASTOLIC BLOOD PRESSURE: 62 MMHG | OXYGEN SATURATION: 98 % | RESPIRATION RATE: 16 BRPM | HEIGHT: 65 IN | BODY MASS INDEX: 24.43 KG/M2 | WEIGHT: 146.6 LBS | HEART RATE: 82 BPM

## 2023-08-25 DIAGNOSIS — L82.1 SEBORRHEIC KERATOSIS: Primary | ICD-10-CM

## 2023-08-25 DIAGNOSIS — K14.3 TONGUE COATING: ICD-10-CM

## 2023-10-16 ENCOUNTER — OFFICE VISIT (OUTPATIENT)
Dept: FAMILY MEDICINE CLINIC | Facility: CLINIC | Age: 73
End: 2023-10-16
Payer: MEDICARE

## 2023-10-16 VITALS
BODY MASS INDEX: 24.86 KG/M2 | WEIGHT: 149.2 LBS | RESPIRATION RATE: 16 BRPM | OXYGEN SATURATION: 97 % | HEIGHT: 65 IN | HEART RATE: 75 BPM | SYSTOLIC BLOOD PRESSURE: 116 MMHG | DIASTOLIC BLOOD PRESSURE: 66 MMHG

## 2023-10-16 DIAGNOSIS — M79.672 ACUTE FOOT PAIN, LEFT: Primary | ICD-10-CM

## 2023-10-16 PROCEDURE — G8399 PT W/DXA RESULTS DOCUMENT: HCPCS | Performed by: FAMILY MEDICINE

## 2023-10-16 PROCEDURE — G8484 FLU IMMUNIZE NO ADMIN: HCPCS | Performed by: FAMILY MEDICINE

## 2023-10-16 PROCEDURE — 99213 OFFICE O/P EST LOW 20 MIN: CPT | Performed by: FAMILY MEDICINE

## 2023-10-16 PROCEDURE — 1090F PRES/ABSN URINE INCON ASSESS: CPT | Performed by: FAMILY MEDICINE

## 2023-10-16 PROCEDURE — 1036F TOBACCO NON-USER: CPT | Performed by: FAMILY MEDICINE

## 2023-10-16 PROCEDURE — G8420 CALC BMI NORM PARAMETERS: HCPCS | Performed by: FAMILY MEDICINE

## 2023-10-16 PROCEDURE — G8427 DOCREV CUR MEDS BY ELIG CLIN: HCPCS | Performed by: FAMILY MEDICINE

## 2023-10-16 PROCEDURE — 1123F ACP DISCUSS/DSCN MKR DOCD: CPT | Performed by: FAMILY MEDICINE

## 2023-10-16 PROCEDURE — 3017F COLORECTAL CA SCREEN DOC REV: CPT | Performed by: FAMILY MEDICINE

## 2023-10-16 ASSESSMENT — ENCOUNTER SYMPTOMS: COLOR CHANGE: 0

## 2023-10-16 NOTE — PROGRESS NOTES
[address] Arnol Castellon, DO  [address], [address] [facility],[address]  Ph No:  [phone]  Fax:  [phone]        Assessment/Plan:   Kayla Alan was seen today for foot pain. Diagnoses and all orders for this visit:    Acute foot pain, left  New problem. Prescribed topical voltaren gel over oral NSAID as per patient preference. Suggested stretches of the soles of feet in the calf to help pain, as well as continued and ice. Patient was concerned with an upcoming trip that would involve a lot of walking. Advised against extensive walking if avoidable. Follow-up in 1 week. If not better may need imaging and/or referral to podiatry. -     diclofenac sodium (VOLTAREN) 1 % GEL; Apply 2 g topically 4 times daily as needed for Pain              Beena Goncalves is a 75 y.o. female who is seen for evaluation of   Chief Complaint   Patient presents with    Foot Pain     Left foot pain, along bottom of foot. Onset 4 days  Pt denies injury        HPI:   Patient presents with a 3-4 day history of pain in the bottom of her foot. The pain is worst in the center of the sole of her foot, but encompasses the entire sole and runs up the inner side of her foot. She says it hurts very bad to stand or walk. She states she has had plantar fasciitis in the past and this feels similar. The pain has not been getting better or worse over the past 3 days. The day before her pain began, she wore different shoes than normal and walked up a steep incline; she states this may have been the cause of her symptoms. She denies fever, chills, weight loss, rash, redness, swelling, numbness, tingling, weakness, radiation of pain, or inciting trauma or injury. She is currently experiencing some low back and knee pain that onset after her foot pain; she believes this is due to her sitting a lot and walking oddly to compensate for her foot pain.  She has been taking ibuprofen, icing the foot, and wearing a plantar

## 2023-11-03 DIAGNOSIS — E03.9 PRIMARY HYPOTHYROIDISM: ICD-10-CM

## 2023-11-03 RX ORDER — LIOTHYRONINE SODIUM 5 UG/1
10 TABLET ORAL DAILY
Qty: 60 TABLET | Refills: 0 | Status: SHIPPED | OUTPATIENT
Start: 2023-11-03 | End: 2023-11-17 | Stop reason: SDUPTHER

## 2023-11-15 ENCOUNTER — NURSE ONLY (OUTPATIENT)
Dept: FAMILY MEDICINE CLINIC | Facility: CLINIC | Age: 73
End: 2023-11-15

## 2023-11-15 DIAGNOSIS — D70.9 NEUTROPENIA, UNSPECIFIED TYPE (HCC): ICD-10-CM

## 2023-11-15 DIAGNOSIS — E78.00 PURE HYPERCHOLESTEROLEMIA, UNSPECIFIED: ICD-10-CM

## 2023-11-15 DIAGNOSIS — D64.9 ANEMIA, UNSPECIFIED TYPE: ICD-10-CM

## 2023-11-15 DIAGNOSIS — E03.9 PRIMARY HYPOTHYROIDISM: ICD-10-CM

## 2023-11-15 LAB
ALBUMIN SERPL-MCNC: 3.9 G/DL (ref 3.2–4.6)
ALBUMIN/GLOB SERPL: 1.3 (ref 0.4–1.6)
ALP SERPL-CCNC: 68 U/L (ref 50–136)
ALT SERPL-CCNC: 28 U/L (ref 12–65)
ANION GAP SERPL CALC-SCNC: 6 MMOL/L (ref 2–11)
AST SERPL-CCNC: 21 U/L (ref 15–37)
BASOPHILS # BLD: 0 K/UL (ref 0–0.2)
BASOPHILS NFR BLD: 1 % (ref 0–2)
BILIRUB SERPL-MCNC: 0.4 MG/DL (ref 0.2–1.1)
BUN SERPL-MCNC: 9 MG/DL (ref 8–23)
CALCIUM SERPL-MCNC: 8.6 MG/DL (ref 8.3–10.4)
CHLORIDE SERPL-SCNC: 104 MMOL/L (ref 101–110)
CHOLEST SERPL-MCNC: 251 MG/DL
CO2 SERPL-SCNC: 27 MMOL/L (ref 21–32)
CREAT SERPL-MCNC: 0.5 MG/DL (ref 0.6–1)
DIFFERENTIAL METHOD BLD: ABNORMAL
EOSINOPHIL # BLD: 0.1 K/UL (ref 0–0.8)
EOSINOPHIL NFR BLD: 4 % (ref 0.5–7.8)
ERYTHROCYTE [DISTWIDTH] IN BLOOD BY AUTOMATED COUNT: 14.6 % (ref 11.9–14.6)
FERRITIN SERPL-MCNC: 103 NG/ML (ref 8–388)
FOLATE SERPL-MCNC: 9.9 NG/ML (ref 3.1–17.5)
GLOBULIN SER CALC-MCNC: 3 G/DL (ref 2.8–4.5)
GLUCOSE SERPL-MCNC: 83 MG/DL (ref 65–100)
HCT VFR BLD AUTO: 34.9 % (ref 35.8–46.3)
HDLC SERPL-MCNC: 81 MG/DL (ref 40–60)
HDLC SERPL: 3.1
HGB BLD-MCNC: 11.3 G/DL (ref 11.7–15.4)
IMM GRANULOCYTES # BLD AUTO: 0 K/UL (ref 0–0.5)
IMM GRANULOCYTES NFR BLD AUTO: 0 % (ref 0–5)
IRON SATN MFR SERPL: 23 %
IRON SERPL-MCNC: 69 UG/DL (ref 35–150)
LDLC SERPL CALC-MCNC: 159.6 MG/DL
LYMPHOCYTES # BLD: 1.6 K/UL (ref 0.5–4.6)
LYMPHOCYTES NFR BLD: 44 % (ref 13–44)
MCH RBC QN AUTO: 29.6 PG (ref 26.1–32.9)
MCHC RBC AUTO-ENTMCNC: 32.4 G/DL (ref 31.4–35)
MCV RBC AUTO: 91.4 FL (ref 82–102)
MONOCYTES # BLD: 0.4 K/UL (ref 0.1–1.3)
MONOCYTES NFR BLD: 10 % (ref 4–12)
NEUTS SEG # BLD: 1.5 K/UL (ref 1.7–8.2)
NEUTS SEG NFR BLD: 41 % (ref 43–78)
NRBC # BLD: 0 K/UL (ref 0–0.2)
PLATELET # BLD AUTO: 298 K/UL (ref 150–450)
PMV BLD AUTO: 9.9 FL (ref 9.4–12.3)
POTASSIUM SERPL-SCNC: 4.3 MMOL/L (ref 3.5–5.1)
PROT SERPL-MCNC: 6.9 G/DL (ref 6.3–8.2)
RBC # BLD AUTO: 3.82 M/UL (ref 4.05–5.2)
SODIUM SERPL-SCNC: 137 MMOL/L (ref 133–143)
TIBC SERPL-MCNC: 295 UG/DL (ref 250–450)
TRIGL SERPL-MCNC: 52 MG/DL (ref 35–150)
TSH, 3RD GENERATION: 2.11 UIU/ML (ref 0.36–3.74)
VIT B12 SERPL-MCNC: 3012 PG/ML (ref 193–986)
VLDLC SERPL CALC-MCNC: 10.4 MG/DL (ref 6–23)
WBC # BLD AUTO: 3.6 K/UL (ref 4.3–11.1)

## 2023-11-17 ENCOUNTER — OFFICE VISIT (OUTPATIENT)
Dept: FAMILY MEDICINE CLINIC | Facility: CLINIC | Age: 73
End: 2023-11-17

## 2023-11-17 VITALS
RESPIRATION RATE: 16 BRPM | SYSTOLIC BLOOD PRESSURE: 114 MMHG | HEART RATE: 86 BPM | WEIGHT: 148.8 LBS | BODY MASS INDEX: 24.79 KG/M2 | DIASTOLIC BLOOD PRESSURE: 66 MMHG | OXYGEN SATURATION: 97 % | HEIGHT: 65 IN

## 2023-11-17 DIAGNOSIS — M70.50 PES ANSERINE BURSITIS: ICD-10-CM

## 2023-11-17 DIAGNOSIS — M25.562 ACUTE PAIN OF BOTH KNEES: ICD-10-CM

## 2023-11-17 DIAGNOSIS — E78.00 PURE HYPERCHOLESTEROLEMIA, UNSPECIFIED: ICD-10-CM

## 2023-11-17 DIAGNOSIS — G54.0 THORACIC OUTLET SYNDROME OF LEFT THORACIC OUTLET: ICD-10-CM

## 2023-11-17 DIAGNOSIS — M25.561 ACUTE PAIN OF BOTH KNEES: ICD-10-CM

## 2023-11-17 DIAGNOSIS — D64.9 ANEMIA, UNSPECIFIED TYPE: ICD-10-CM

## 2023-11-17 DIAGNOSIS — E03.9 PRIMARY HYPOTHYROIDISM: Primary | ICD-10-CM

## 2023-11-17 RX ORDER — LEVOTHYROXINE SODIUM 0.05 MG/1
50 TABLET ORAL
Qty: 90 TABLET | Refills: 1 | Status: SHIPPED | OUTPATIENT
Start: 2023-11-17

## 2023-11-17 RX ORDER — LIOTHYRONINE SODIUM 5 UG/1
10 TABLET ORAL DAILY
Qty: 180 TABLET | Refills: 1 | Status: SHIPPED | OUTPATIENT
Start: 2023-11-17

## 2023-11-17 ASSESSMENT — ENCOUNTER SYMPTOMS
VOMITING: 0
NAUSEA: 0
COUGH: 0
BLOOD IN STOOL: 0
SHORTNESS OF BREATH: 0
ABDOMINAL PAIN: 0

## 2023-11-17 NOTE — PROGRESS NOTES
11/17/23 1142   BP: 114/66   Site: Left Upper Arm   Position: Sitting   Pulse: 86   Resp: 16   SpO2: 97%   Weight: 67.5 kg (148 lb 12.8 oz)   Height: 1.651 m (5' 5\")          Physical Exam:  Physical Exam  Vitals reviewed. Constitutional:       Appearance: Normal appearance. HENT:      Head: Normocephalic and atraumatic. Cardiovascular:      Rate and Rhythm: Normal rate and regular rhythm. Heart sounds: No murmur heard. Pulmonary:      Effort: Pulmonary effort is normal. No respiratory distress. Breath sounds: Normal breath sounds. No wheezing or rhonchi. Abdominal:      General: There is no distension. Palpations: There is no mass. Tenderness: There is no abdominal tenderness. There is no right CVA tenderness, left CVA tenderness, guarding or rebound. Hernia: No hernia is present. Musculoskeletal:      Cervical back: Neck supple. Right lower leg: No edema. Left lower leg: No edema. Comments: Some crepitus bilateral knees with flexion extension. There is no joint line tenderness. There is tenderness to palpation of the Pez anserine bursitis bilaterally. Considerable hypertonicity of the left supraspinatus muscle. Lymphadenopathy:      Cervical: No cervical adenopathy. Skin:     General: Skin is warm and dry. Neurological:      Mental Status: She is alert. Psychiatric:         Mood and Affect: Mood normal.         Behavior: Behavior normal.             Laya Cardona,     This note was generated using Dragon voice recognition software.   There may be medical errors due to computer generated translation

## 2024-05-16 ENCOUNTER — NURSE ONLY (OUTPATIENT)
Dept: FAMILY MEDICINE CLINIC | Facility: CLINIC | Age: 74
End: 2024-05-16

## 2024-05-16 DIAGNOSIS — D64.9 ANEMIA, UNSPECIFIED TYPE: ICD-10-CM

## 2024-05-16 DIAGNOSIS — E03.9 PRIMARY HYPOTHYROIDISM: ICD-10-CM

## 2024-05-16 DIAGNOSIS — E78.00 PURE HYPERCHOLESTEROLEMIA, UNSPECIFIED: ICD-10-CM

## 2024-05-16 LAB
ALBUMIN SERPL-MCNC: 4.2 G/DL (ref 3.2–4.6)
ALBUMIN/GLOB SERPL: 1.5 (ref 1–1.9)
ALP SERPL-CCNC: 66 U/L (ref 35–104)
ALT SERPL-CCNC: 19 U/L (ref 12–65)
ANION GAP SERPL CALC-SCNC: 10 MMOL/L (ref 9–18)
AST SERPL-CCNC: 23 U/L (ref 15–37)
BASOPHILS # BLD: 0 K/UL (ref 0–0.2)
BASOPHILS NFR BLD: 1 % (ref 0–2)
BILIRUB SERPL-MCNC: 0.4 MG/DL (ref 0–1.2)
BUN SERPL-MCNC: 10 MG/DL (ref 8–23)
CALCIUM SERPL-MCNC: 9.1 MG/DL (ref 8.8–10.2)
CHLORIDE SERPL-SCNC: 102 MMOL/L (ref 98–107)
CHOLEST SERPL-MCNC: 269 MG/DL (ref 0–200)
CO2 SERPL-SCNC: 26 MMOL/L (ref 20–28)
CREAT SERPL-MCNC: 0.53 MG/DL (ref 0.6–1.1)
DIFFERENTIAL METHOD BLD: ABNORMAL
EOSINOPHIL # BLD: 0 K/UL (ref 0–0.8)
EOSINOPHIL NFR BLD: 1 % (ref 0.5–7.8)
ERYTHROCYTE [DISTWIDTH] IN BLOOD BY AUTOMATED COUNT: 14.6 % (ref 11.9–14.6)
GLOBULIN SER CALC-MCNC: 2.8 G/DL (ref 2.3–3.5)
GLUCOSE SERPL-MCNC: 88 MG/DL (ref 70–99)
HCT VFR BLD AUTO: 34.9 % (ref 35.8–46.3)
HDLC SERPL-MCNC: 85 MG/DL (ref 40–60)
HDLC SERPL: 3.2 (ref 0–5)
HGB BLD-MCNC: 11 G/DL (ref 11.7–15.4)
IMM GRANULOCYTES # BLD AUTO: 0 K/UL (ref 0–0.5)
IMM GRANULOCYTES NFR BLD AUTO: 0 % (ref 0–5)
LDLC SERPL CALC-MCNC: 173 MG/DL (ref 0–100)
LYMPHOCYTES # BLD: 1.8 K/UL (ref 0.5–4.6)
LYMPHOCYTES NFR BLD: 46 % (ref 13–44)
MCH RBC QN AUTO: 28.7 PG (ref 26.1–32.9)
MCHC RBC AUTO-ENTMCNC: 31.5 G/DL (ref 31.4–35)
MCV RBC AUTO: 91.1 FL (ref 82–102)
MONOCYTES # BLD: 0.4 K/UL (ref 0.1–1.3)
MONOCYTES NFR BLD: 10 % (ref 4–12)
NEUTS SEG # BLD: 1.6 K/UL (ref 1.7–8.2)
NEUTS SEG NFR BLD: 42 % (ref 43–78)
NRBC # BLD: 0 K/UL (ref 0–0.2)
PLATELET # BLD AUTO: 286 K/UL (ref 150–450)
PMV BLD AUTO: 9.8 FL (ref 9.4–12.3)
POTASSIUM SERPL-SCNC: 4.3 MMOL/L (ref 3.5–5.1)
PROT SERPL-MCNC: 6.9 G/DL (ref 6.3–8.2)
RBC # BLD AUTO: 3.83 M/UL (ref 4.05–5.2)
SODIUM SERPL-SCNC: 137 MMOL/L (ref 136–145)
TRIGL SERPL-MCNC: 56 MG/DL (ref 0–150)
TSH, 3RD GENERATION: 1.96 UIU/ML (ref 0.27–4.2)
VLDLC SERPL CALC-MCNC: 11 MG/DL (ref 6–23)
WBC # BLD AUTO: 3.8 K/UL (ref 4.3–11.1)

## 2024-05-20 SDOH — ECONOMIC STABILITY: FOOD INSECURITY: WITHIN THE PAST 12 MONTHS, YOU WORRIED THAT YOUR FOOD WOULD RUN OUT BEFORE YOU GOT MONEY TO BUY MORE.: NEVER TRUE

## 2024-05-20 SDOH — ECONOMIC STABILITY: FOOD INSECURITY: WITHIN THE PAST 12 MONTHS, THE FOOD YOU BOUGHT JUST DIDN'T LAST AND YOU DIDN'T HAVE MONEY TO GET MORE.: NEVER TRUE

## 2024-05-20 SDOH — ECONOMIC STABILITY: INCOME INSECURITY: HOW HARD IS IT FOR YOU TO PAY FOR THE VERY BASICS LIKE FOOD, HOUSING, MEDICAL CARE, AND HEATING?: NOT HARD AT ALL

## 2024-05-20 ASSESSMENT — PATIENT HEALTH QUESTIONNAIRE - PHQ9
SUM OF ALL RESPONSES TO PHQ QUESTIONS 1-9: 0
SUM OF ALL RESPONSES TO PHQ9 QUESTIONS 1 & 2: 0
2. FEELING DOWN, DEPRESSED OR HOPELESS: NOT AT ALL
SUM OF ALL RESPONSES TO PHQ9 QUESTIONS 1 & 2: 0
SUM OF ALL RESPONSES TO PHQ QUESTIONS 1-9: 0
2. FEELING DOWN, DEPRESSED OR HOPELESS: NOT AT ALL
SUM OF ALL RESPONSES TO PHQ QUESTIONS 1-9: 0
1. LITTLE INTEREST OR PLEASURE IN DOING THINGS: NOT AT ALL
1. LITTLE INTEREST OR PLEASURE IN DOING THINGS: NOT AT ALL
SUM OF ALL RESPONSES TO PHQ QUESTIONS 1-9: 0

## 2024-05-23 ENCOUNTER — OFFICE VISIT (OUTPATIENT)
Dept: FAMILY MEDICINE CLINIC | Facility: CLINIC | Age: 74
End: 2024-05-23

## 2024-05-23 VITALS
SYSTOLIC BLOOD PRESSURE: 132 MMHG | DIASTOLIC BLOOD PRESSURE: 86 MMHG | HEIGHT: 65 IN | BODY MASS INDEX: 24.86 KG/M2 | WEIGHT: 149.2 LBS | OXYGEN SATURATION: 100 % | HEART RATE: 86 BPM

## 2024-05-23 DIAGNOSIS — Z53.20 STATIN DECLINED: ICD-10-CM

## 2024-05-23 DIAGNOSIS — L84 CORN OF FOOT: ICD-10-CM

## 2024-05-23 DIAGNOSIS — Z00.00 MEDICARE ANNUAL WELLNESS VISIT, SUBSEQUENT: ICD-10-CM

## 2024-05-23 DIAGNOSIS — D70.9 NEUTROPENIA, UNSPECIFIED TYPE (HCC): ICD-10-CM

## 2024-05-23 DIAGNOSIS — G44.209 TENSION HEADACHE: ICD-10-CM

## 2024-05-23 DIAGNOSIS — D64.9 ANEMIA, UNSPECIFIED TYPE: ICD-10-CM

## 2024-05-23 DIAGNOSIS — E78.00 PURE HYPERCHOLESTEROLEMIA, UNSPECIFIED: ICD-10-CM

## 2024-05-23 DIAGNOSIS — E03.9 PRIMARY HYPOTHYROIDISM: Primary | ICD-10-CM

## 2024-05-23 RX ORDER — LIOTHYRONINE SODIUM 5 UG/1
10 TABLET ORAL DAILY
Qty: 180 TABLET | Refills: 1 | Status: SHIPPED | OUTPATIENT
Start: 2024-05-23

## 2024-05-23 RX ORDER — LEVOTHYROXINE SODIUM 0.05 MG/1
50 TABLET ORAL
Qty: 90 TABLET | Refills: 1 | Status: SHIPPED | OUTPATIENT
Start: 2024-05-23

## 2024-05-23 ASSESSMENT — LIFESTYLE VARIABLES
HOW OFTEN DO YOU HAVE A DRINK CONTAINING ALCOHOL: MONTHLY OR LESS
HOW MANY STANDARD DRINKS CONTAINING ALCOHOL DO YOU HAVE ON A TYPICAL DAY: PATIENT DOES NOT DRINK

## 2024-05-23 ASSESSMENT — ENCOUNTER SYMPTOMS
BLOOD IN STOOL: 0
SHORTNESS OF BREATH: 0
VOMITING: 0
NAUSEA: 0
COUGH: 0
ABDOMINAL PAIN: 0

## 2024-05-23 NOTE — PROGRESS NOTES
Medicare Annual Wellness Visit    Beena Mistry is here for Hypothyroidism (Follow up/Labs done 5/16; discuss results/Possible referral to Endo ), Weight Gain (Reports increase in weight since changing thyroid medication. Would like hormone levels checked. ), Foot Problem (Reports when she walks bare foot she feels a knot on the bottom of right foot ), Headache (Reports being woken up a few times due to headaches; has had 2-3 episodes where she had to get up and take medication ), and Medicare AWV    Assessment & Plan   Primary hypothyroidism  -     liothyronine (CYTOMEL) 5 MCG tablet; Take 2 tablets by mouth daily, Disp-180 tablet, R-1Normal  -     levothyroxine (SYNTHROID) 50 MCG tablet; Take 1 tablet by mouth every morning (before breakfast), Disp-90 tablet, R-1Normal  -     CBC with Auto Differential; Future  -     Comprehensive Metabolic Panel; Future  -     Lipid Panel; Future  -     TSH; Future  Neutropenia, unspecified type (HCC)  -     CBC with Auto Differential; Future  -     Comprehensive Metabolic Panel; Future  -     Lipid Panel; Future  -     TSH; Future  Anemia, unspecified type  -     CBC with Auto Differential; Future  -     Comprehensive Metabolic Panel; Future  -     Lipid Panel; Future  -     TSH; Future  Pure hypercholesterolemia, unspecified  -     CBC with Auto Differential; Future  -     Comprehensive Metabolic Panel; Future  -     Lipid Panel; Future  -     TSH; Future  Tension headache  Statin declined  Corn of foot  BMI 24.0-24.9, adult  Medicare annual wellness visit, subsequent  Recommendations for Preventive Services Due: see orders and patient instructions/AVS.  Recommended screening schedule for the next 5-10 years is provided to the patient in written form: see Patient Instructions/AVS.     Return in about 6 months (around 11/23/2024) for thyroid, chol-labs prior.     Subjective       Patient's complete Health Risk Assessment and screening values have been reviewed and are 
higher cholesterol.  She is not doing resistance training but she will be opening up her pool this summer and can get back into doing resistance in the water.    Review of Systems:  Review of Systems   Constitutional:  Negative for chills, fever and unexpected weight change.   Respiratory:  Negative for cough and shortness of breath.    Cardiovascular:  Negative for chest pain.   Gastrointestinal:  Negative for abdominal pain, blood in stool, nausea and vomiting.   Psychiatric/Behavioral:  The patient is not nervous/anxious.          History:  Past Medical History:   Diagnosis Date    Adenocarcinoma of duodenum (HCC) 2010    S/P whipple    Lichen sclerosus     OAB (overactive bladder)     Osteoporosis     Primary hypothyroidism        Past Surgical History:   Procedure Laterality Date    HEMORRHOID SURGERY  2020    OTHER SURGICAL HISTORY  2010    whipple procedure        Current Outpatient Medications   Medication Sig Dispense Refill    liothyronine (CYTOMEL) 5 MCG tablet Take 2 tablets by mouth daily 180 tablet 1    levothyroxine (SYNTHROID) 50 MCG tablet Take 1 tablet by mouth every morning (before breakfast) 90 tablet 1    Probiotic Product (PROBIOTIC-10 PO) Take by mouth      Cobalamin Combinations (B-12) 100-5000 MCG SUBL Place under the tongue daily      vitamin C (ASCORBIC ACID) 500 MG tablet Take 1 tablet by mouth daily      LYCOPENE PO Take by mouth as needed      vitamin D3 (CHOLECALCIFEROL) 125 MCG (5000 UT) TABS tablet Take by mouth daily      Coenzyme Q10 10 MG CAPS Take by mouth      magnesium oxide (MAG-OX) 400 (240 Mg) MG tablet Take 1 tablet by mouth daily      melatonin 3 MG TABS tablet Take by mouth      vitamin E 400 UNIT capsule Take by mouth daily      SUMAtriptan (IMITREX) 50 MG tablet Take 1 tablet by mouth       No current facility-administered medications for this visit.       Immunization History   Administered Date(s) Administered    TDaP, ADACEL (age 10y-64y), BOOSTRIX (age 10y+), IM,

## 2024-05-23 NOTE — PATIENT INSTRUCTIONS
Learning About Being Active as an Older Adult  Why is being active important as you get older?     Being active is one of the best things you can do for your health. And it's never too late to start. Being active--or getting active, if you aren't already--has definite benefits. It can:  Give you more energy,  Keep your mind sharp.  Improve balance to reduce your risk of falls.  Help you manage chronic illness with fewer medicines.  No matter how old you are, how fit you are, or what health problems you have, there is a form of activity that will work for you. And the more physical activity you can do, the better your overall health will be.  What kinds of activity can help you stay healthy?  Being more active will make your daily activities easier. Physical activity includes planned exercise and things you do in daily life. There are four types of activity:  Aerobic.  Doing aerobic activity makes your heart and lungs strong.  Includes walking, dancing, and gardening.  Aim for at least 2½ hours spread throughout the week.  It improves your energy and can help you sleep better.  Muscle-strengthening.  This type of activity can help maintain muscle and strengthen bones.  Includes climbing stairs, using resistance bands, and lifting or carrying heavy loads.  Aim for at least twice a week.  It can help protect the knees and other joints.  Stretching.  Stretching gives you better range of motion in joints and muscles.  Includes upper arm stretches, calf stretches, and gentle yoga.  Aim for at least twice a week, preferably after your muscles are warmed up from other activities.  It can help you function better in daily life.  Balancing.  This helps you stay coordinated and have good posture.  Includes heel-to-toe walking, raquel chi, and certain types of yoga.  Aim for at least 3 days a week.  It can reduce your risk of falling.  Even if you have a hard time meeting the recommendations, it's better to be more active

## 2024-11-15 ENCOUNTER — OFFICE VISIT (OUTPATIENT)
Dept: FAMILY MEDICINE CLINIC | Facility: CLINIC | Age: 74
End: 2024-11-15

## 2024-11-15 VITALS
RESPIRATION RATE: 16 BRPM | HEART RATE: 77 BPM | SYSTOLIC BLOOD PRESSURE: 110 MMHG | WEIGHT: 149.4 LBS | OXYGEN SATURATION: 96 % | DIASTOLIC BLOOD PRESSURE: 66 MMHG | HEIGHT: 65 IN | BODY MASS INDEX: 24.89 KG/M2

## 2024-11-15 DIAGNOSIS — N81.9 FEMALE GENITAL PROLAPSE, UNSPECIFIED TYPE: Primary | ICD-10-CM

## 2024-11-15 DIAGNOSIS — M17.12 ARTHRITIS OF LEFT KNEE: ICD-10-CM

## 2024-11-15 NOTE — PROGRESS NOTES
[facility]  Cheryl Forman DO  [address] [facility] [place] [address]  Ph No:  [phone]  Fax:  [phone]        Assessment/Plan:   Beena was seen today for referral - general.    Diagnoses and all orders for this visit:    Female genital prolapse, unspecified type  Worsening.  Placed referral to UR O/GYN.  Has completed pelvic floor PT in the past.  Still doing those exercises that were taught to her.  But symptoms are worsening and bothering her and affecting her urination.  She states she would be willing to consider options such as surgical or pessary which is part of the reason she wants to see a specialist.  -     [facility] - Keyla Garcia DO, Urogynecology, [place]    Arthritis of left knee  Reviewed x-ray report from care everywhere.  Mild arthritis noted.  She reports that ibuprofen and exercises has been beneficial.  Continue conservative measures for now.  She does have follow-up next week and we can address further if needed.        Labs:  No results found for this visit on 11/15/24.            Beena Mistry is a 74 y.o. female who is seen for evaluation of   Chief Complaint   Patient presents with    Referral - General     Pt requesting referral for GYN for prolapse.         HPI:   Has history of a vaginal prolapse.  Has done physical therapy.  Was told by physical therapist that part of it was her rectum was in the vaginal vault.  She notes that this is affecting her bladder.  She is uncomfortable, she is feeling flushed in the vaginal vault when she is using the restroom or cleaning up afterwards.  She recently developed with constipation after using ibuprofen for left knee injury.  Very common for her to get constipation after anti-inflammatories but it makes the rectocele discomfort worse.  For the knee she went to the urgent care, had x-rays and was told she has some degeneration.  Chiropractor has provided her with some exercise and has found this

## 2024-11-20 DIAGNOSIS — E03.9 PRIMARY HYPOTHYROIDISM: ICD-10-CM

## 2024-11-21 ENCOUNTER — LAB (OUTPATIENT)
Dept: FAMILY MEDICINE CLINIC | Facility: CLINIC | Age: 74
End: 2024-11-21

## 2024-11-21 DIAGNOSIS — E78.00 PURE HYPERCHOLESTEROLEMIA, UNSPECIFIED: ICD-10-CM

## 2024-11-21 DIAGNOSIS — D64.9 ANEMIA, UNSPECIFIED TYPE: ICD-10-CM

## 2024-11-21 DIAGNOSIS — E03.9 PRIMARY HYPOTHYROIDISM: ICD-10-CM

## 2024-11-21 DIAGNOSIS — D70.9 NEUTROPENIA, UNSPECIFIED TYPE (HCC): ICD-10-CM

## 2024-11-21 LAB
ALBUMIN SERPL-MCNC: 3.8 G/DL (ref 3.2–4.6)
ALBUMIN/GLOB SERPL: 1.4 (ref 1–1.9)
ALP SERPL-CCNC: 63 U/L (ref 35–104)
ALT SERPL-CCNC: 17 U/L (ref 8–45)
ANION GAP SERPL CALC-SCNC: 11 MMOL/L (ref 7–16)
AST SERPL-CCNC: 21 U/L (ref 15–37)
BASOPHILS # BLD: 0 K/UL (ref 0–0.2)
BASOPHILS NFR BLD: 1 % (ref 0–2)
BILIRUB SERPL-MCNC: 0.4 MG/DL (ref 0–1.2)
BUN SERPL-MCNC: 7 MG/DL (ref 8–23)
CALCIUM SERPL-MCNC: 8.8 MG/DL (ref 8.8–10.2)
CHLORIDE SERPL-SCNC: 103 MMOL/L (ref 98–107)
CHOLEST SERPL-MCNC: 228 MG/DL (ref 0–200)
CO2 SERPL-SCNC: 25 MMOL/L (ref 20–29)
CREAT SERPL-MCNC: 0.5 MG/DL (ref 0.6–1.1)
DIFFERENTIAL METHOD BLD: ABNORMAL
EOSINOPHIL # BLD: 0.1 K/UL (ref 0–0.8)
EOSINOPHIL NFR BLD: 2 % (ref 0.5–7.8)
ERYTHROCYTE [DISTWIDTH] IN BLOOD BY AUTOMATED COUNT: 15 % (ref 11.9–14.6)
GLOBULIN SER CALC-MCNC: 2.8 G/DL (ref 2.3–3.5)
GLUCOSE SERPL-MCNC: 84 MG/DL (ref 70–99)
HCT VFR BLD AUTO: 33.3 % (ref 35.8–46.3)
HDLC SERPL-MCNC: 84 MG/DL (ref 40–60)
HDLC SERPL: 2.7 (ref 0–5)
HGB BLD-MCNC: 10.6 G/DL (ref 11.7–15.4)
IMM GRANULOCYTES # BLD AUTO: 0 K/UL (ref 0–0.5)
IMM GRANULOCYTES NFR BLD AUTO: 0 % (ref 0–5)
LDLC SERPL CALC-MCNC: 133 MG/DL (ref 0–100)
LYMPHOCYTES # BLD: 1.7 K/UL (ref 0.5–4.6)
LYMPHOCYTES NFR BLD: 45 % (ref 13–44)
MCH RBC QN AUTO: 28.6 PG (ref 26.1–32.9)
MCHC RBC AUTO-ENTMCNC: 31.8 G/DL (ref 31.4–35)
MCV RBC AUTO: 89.8 FL (ref 82–102)
MONOCYTES # BLD: 0.4 K/UL (ref 0.1–1.3)
MONOCYTES NFR BLD: 10 % (ref 4–12)
NEUTS SEG # BLD: 1.6 K/UL (ref 1.7–8.2)
NEUTS SEG NFR BLD: 42 % (ref 43–78)
NRBC # BLD: 0 K/UL (ref 0–0.2)
PLATELET # BLD AUTO: 270 K/UL (ref 150–450)
PMV BLD AUTO: 9.6 FL (ref 9.4–12.3)
POTASSIUM SERPL-SCNC: 4 MMOL/L (ref 3.5–5.1)
PROT SERPL-MCNC: 6.6 G/DL (ref 6.3–8.2)
RBC # BLD AUTO: 3.71 M/UL (ref 4.05–5.2)
SODIUM SERPL-SCNC: 138 MMOL/L (ref 136–145)
TRIGL SERPL-MCNC: 55 MG/DL (ref 0–150)
TSH, 3RD GENERATION: 2.36 UIU/ML (ref 0.27–4.2)
VLDLC SERPL CALC-MCNC: 11 MG/DL (ref 6–23)
WBC # BLD AUTO: 3.9 K/UL (ref 4.3–11.1)

## 2024-11-25 ENCOUNTER — OFFICE VISIT (OUTPATIENT)
Dept: FAMILY MEDICINE CLINIC | Facility: CLINIC | Age: 74
End: 2024-11-25

## 2024-11-25 VITALS
RESPIRATION RATE: 16 BRPM | BODY MASS INDEX: 25.06 KG/M2 | OXYGEN SATURATION: 97 % | HEART RATE: 75 BPM | HEIGHT: 65 IN | WEIGHT: 150.4 LBS | SYSTOLIC BLOOD PRESSURE: 112 MMHG | DIASTOLIC BLOOD PRESSURE: 64 MMHG

## 2024-11-25 DIAGNOSIS — D64.9 ANEMIA, UNSPECIFIED TYPE: ICD-10-CM

## 2024-11-25 DIAGNOSIS — E03.9 PRIMARY HYPOTHYROIDISM: Primary | ICD-10-CM

## 2024-11-25 DIAGNOSIS — D70.9 NEUTROPENIA, UNSPECIFIED TYPE (HCC): ICD-10-CM

## 2024-11-25 DIAGNOSIS — Z12.11 COLON CANCER SCREENING: ICD-10-CM

## 2024-11-25 DIAGNOSIS — E78.00 PURE HYPERCHOLESTEROLEMIA, UNSPECIFIED: ICD-10-CM

## 2024-11-25 RX ORDER — LIOTHYRONINE SODIUM 5 UG/1
10 TABLET ORAL DAILY
Qty: 180 TABLET | Refills: 1 | Status: SHIPPED | OUTPATIENT
Start: 2024-11-25

## 2024-11-25 RX ORDER — LEVOTHYROXINE SODIUM 50 UG/1
50 TABLET ORAL
Qty: 90 TABLET | Refills: 1 | Status: SHIPPED | OUTPATIENT
Start: 2024-11-25

## 2024-11-25 ASSESSMENT — ENCOUNTER SYMPTOMS
BLOOD IN STOOL: 0
ABDOMINAL PAIN: 0
SHORTNESS OF BREATH: 0
NAUSEA: 0
VOMITING: 0
COUGH: 0

## 2024-11-25 NOTE — PROGRESS NOTES
[facility]  Cheryl Gonzalez Dickson, DO  [address] [facility] [place] [address]   No:  [phone]  Fax:  [phone]        Assessment/Plan:   Beena was seen today for follow-up.    Diagnoses and all orders for this visit:    Primary hypothyroidism  TSH therapeutic.  Continue liothyronine and levothyroxine.  This combination has been working well for her    Neutropenia, unspecified type (HCC)  Stable for years that she is completed chemotherapy    Anemia, unspecified type  Reviewed below labs.  Stable for years since completing chemotherapy in 2010    Pure hypercholesterolemia, unspecified  Improved considerably with lifestyle changes.  She has been adamant in the past that she would not consider a statin.  Reviewed below labs.    Colon cancer screening  -     Cologuard (Fecal DNA Colorectal Cancer Screening)        Assessment & Plan  1. Hypertension.  Blood pressure management is stable. No reports of scary chest pain, shortness of breath, or heart rate irregularities. Continue current treatment plan.    2. Hyperlipidemia.  LDL cholesterol has decreased by 40 points from 173 to 133, marking significant improvement. HDL cholesterol remains high at 84, and triglycerides are low at 55. Continue current treatment plan.    3. Neutropenia.  White blood cell count is stable, though on the lower side, unchanged for several years. This is attributed to past chemotherapy. Continue monitoring.    4. Anemia.  Hemoglobin levels are stable, though on the lower side, unchanged for several years. This is attributed to past chemotherapy. Continue monitoring.    5. Health Maintenance.  A Cologuard test will be ordered for colon cancer screening.    6. Medication Management.  She does not require a refill for Imitrex at this time as she still has some from a previous prescription. Prescriptions will be sent to [facility].            Labs:  No results found for this visit on 11/25/24.    Lab on 11/21/2024

## 2024-11-25 NOTE — PATIENT INSTRUCTIONS
[employer] disclaims any warranty or liability for your use of this information.

## 2024-12-19 LAB — NONINV COLON CA DNA+OCC BLD SCRN STL QL: NEGATIVE

## 2025-02-12 ENCOUNTER — OFFICE VISIT (OUTPATIENT)
Dept: UROGYNECOLOGY | Age: 75
End: 2025-02-12
Payer: MEDICARE

## 2025-02-12 VITALS — WEIGHT: 153 LBS | HEIGHT: 65 IN | BODY MASS INDEX: 25.49 KG/M2

## 2025-02-12 DIAGNOSIS — N81.3 UTEROVAGINAL PROLAPSE, COMPLETE: Primary | ICD-10-CM

## 2025-02-12 DIAGNOSIS — N81.89 WEAKNESS OF PELVIC FLOOR: ICD-10-CM

## 2025-02-12 LAB
BILIRUBIN, URINE, POC: NEGATIVE
BLOOD URINE, POC: NEGATIVE
GLUCOSE URINE, POC: NEGATIVE
KETONES, URINE, POC: NEGATIVE
LEUKOCYTE ESTERASE, URINE, POC: NEGATIVE
NITRITE, URINE, POC: NEGATIVE
PH, URINE, POC: 6 (ref 4.6–8)
PROTEIN,URINE, POC: NEGATIVE
SPECIFIC GRAVITY, URINE, POC: 1.01 (ref 1–1.03)
URINALYSIS CLARITY, POC: CLEAR
URINALYSIS COLOR, POC: YELLOW
UROBILINOGEN, POC: NORMAL MG/DL

## 2025-02-12 PROCEDURE — G8427 DOCREV CUR MEDS BY ELIG CLIN: HCPCS | Performed by: OBSTETRICS & GYNECOLOGY

## 2025-02-12 PROCEDURE — 1090F PRES/ABSN URINE INCON ASSESS: CPT | Performed by: OBSTETRICS & GYNECOLOGY

## 2025-02-12 PROCEDURE — 99204 OFFICE O/P NEW MOD 45 MIN: CPT | Performed by: OBSTETRICS & GYNECOLOGY

## 2025-02-12 PROCEDURE — 1123F ACP DISCUSS/DSCN MKR DOCD: CPT | Performed by: OBSTETRICS & GYNECOLOGY

## 2025-02-12 PROCEDURE — 1036F TOBACCO NON-USER: CPT | Performed by: OBSTETRICS & GYNECOLOGY

## 2025-02-12 PROCEDURE — 81002 URINALYSIS NONAUTO W/O SCOPE: CPT | Performed by: OBSTETRICS & GYNECOLOGY

## 2025-02-12 PROCEDURE — G8399 PT W/DXA RESULTS DOCUMENT: HCPCS | Performed by: OBSTETRICS & GYNECOLOGY

## 2025-02-12 PROCEDURE — 1159F MED LIST DOCD IN RCRD: CPT | Performed by: OBSTETRICS & GYNECOLOGY

## 2025-02-12 PROCEDURE — 3017F COLORECTAL CA SCREEN DOC REV: CPT | Performed by: OBSTETRICS & GYNECOLOGY

## 2025-02-12 PROCEDURE — G8419 CALC BMI OUT NRM PARAM NOF/U: HCPCS | Performed by: OBSTETRICS & GYNECOLOGY

## 2025-02-12 PROCEDURE — 51701 INSERT BLADDER CATHETER: CPT | Performed by: OBSTETRICS & GYNECOLOGY

## 2025-02-12 NOTE — ASSESSMENT & PLAN NOTE
We discussed the epidemiology, pathogenesis and etiology of pelvic organ prolapse. We discussed the potential risk factors which include genetics and environmental factors, such as childbirth, aging, menopause, straining, and previous surgery. I used visuals to describe the pelvic anatomy and to show her what a \"normal\" pelvic exam should look like in comparison to her exam with pelvic organ prolapse.     I offered her management options which included doing nothing, wearing a pessary, and surgery. We discussed the pros and cons of each of those options. I also described the different types of surgery that are offered for POP and the post operative expectations and restrictions.    After answering all of her questions, she is interested in: possible pessary. However she would like to consider PT as well.

## 2025-02-12 NOTE — PROGRESS NOTES
Pulmonary effort is normal. No respiratory distress.   Abdominal:      General: There is no distension.      Palpations: There is no mass.      Tenderness: There is no abdominal tenderness. There is no guarding or rebound.      Hernia: No hernia is present.       Musculoskeletal:      Cervical back: Normal range of motion.   Skin:     General: Skin is warm and dry.   Neurological:      Mental Status: She is alert and oriented to person, place, and time.   Psychiatric:         Behavior: Behavior normal.         Thought Content: Thought content normal.         Judgment: Judgment normal.          Female Genitourinary This includes at least 4 minutes of clinical staff time associated with chaperoning a pelvic exam.  Vulva:    Normal. No lesions  Bartholin's Gland:  Bilateral , Normal, nontender  Skenes Gland:  Bilateral, Normal, nontender   Clitoris:  Normal.   Introitus:    Normal.   Urethral Meatus:  Normal appearing, normal size, no lesions, no prolapse  Urethra:  No masses, no tenderness  Vagina:  No atrophy, no discharge, no lesions  Cervix:  No lesions, no discharge  Uterus:  No tenderness, normal mobility   Adnexa:   No masses palpated, no tenderness  Bladder:  No tenderness, no masses palpated  Perineum:  Normal, no lesions    Rectal   Anorectal Exam: No hemorrhoids and no masses or lesions of the perineum    POP-Q: (Pelvic Organ Prolapse - Quantification Exam):      2/12/2025     1:00 PM   Pelvic Organ Prolapse Quantification   Anterior Wall (Aa) 3   Anterior Wall (Ba) 3   Cervix or Cuff (C) 3   Genital Haitus (gh) 2   Perineal Body (pb) 2   Total Vaginal Length (tvl) 8   Posterior Wall (Ap) -2   Posterior Wall (Bp) -2   Posterior Fornix (D) -7        Pelvic floor muscles: Tender Spasm     R. Puborectalis: NO 0 /5    L. Puborectalis: NO 0 /5    R. Pubococcyg NO 0 /5    L. Pubococcyg NO 0 /5    R. Ileococcyg: NO 0 /5    L. Ileococcyg: NO 0 /5    R. Obturator Int: NO 0 /5    L. Obturator Int: NO 0 /5    R.

## 2025-02-12 NOTE — PATIENT INSTRUCTIONS
Pelvic Floor Therapy List:    Locations within [facility]    Scheduling phone number: [phone]    [facility]       [address]  [facility] [address]    [facility]  [address]  [facility] [address]    [facility]  [address]  [place] [address]    [facility]  [address] [facility] [address]      Locations Outside of [facility]    [facility]- (Luz Sousa & Naheed De Jesus)  [address]  [facility] [address]  Phone: [phone]  Fax [phone]    [facility] -(Leann Lopez)  [address] [facility] [address]  Phone: [phone]  Fax: [phone]    [facility] (Vika Leung)  [address]  [place] [address]  Phone: [phone]  Fax: [phone]    [facility] (Judi Mtz)  [address] [facility] [address]  Phone: [phone]  Fax: [phone]    [facility] (Dr. Eboni Arreola and Dr. Vika Stacy)  [address],   [place] [address]   Phone: [phone]  Fax: [phone]  &  [address] [address]    [facility] (Devi Carmona)  [address] [place] [address]  Phone: [phone]  Fax: [phone]    [facility] [place]- (Martha Bhatti)  [address]  [facility] [address]  Phone: [phone]  Fax: [phone]    [facility] (Yari Meneses)  [address] [place] [address] [phone]  Fax [phone]    [facility]- (Layton Mcconnell)  [address]   [place] [address]  Phone: [phone]  Fax: [phone]    [facility] (Naheed Daly)  [address] [place] [address]  Phone: [phone]  Fax: [phone]

## 2025-02-17 ENCOUNTER — TELEPHONE (OUTPATIENT)
Dept: UROGYNECOLOGY | Age: 75
End: 2025-02-17

## 2025-02-20 ENCOUNTER — HOSPITAL ENCOUNTER (OUTPATIENT)
Dept: PHYSICAL THERAPY | Age: 75
Setting detail: RECURRING SERIES
Discharge: HOME | End: 2025-02-23
Attending: OBSTETRICS & GYNECOLOGY
Payer: MEDICARE

## 2025-02-20 DIAGNOSIS — M62.81 MUSCLE WEAKNESS (GENERALIZED): Primary | ICD-10-CM

## 2025-02-20 DIAGNOSIS — M62.89 PELVIC FLOOR DYSFUNCTION: ICD-10-CM

## 2025-02-20 PROCEDURE — 97530 THERAPEUTIC ACTIVITIES: CPT

## 2025-02-20 PROCEDURE — 97161 PT EVAL LOW COMPLEX 20 MIN: CPT

## 2025-02-20 ASSESSMENT — PAIN SCALES - GENERAL: PAINLEVEL_OUTOF10: 0

## 2025-02-20 NOTE — THERAPY EVALUATION
Beena Mistry  : 1950  Primary: Medicare Part A And B (Medicare)  Secondary: [employer] MEDICARE SUPP [facility] [place] [address] [place] [address] [place] [address]  Phone: [phone]  Fax: [phone] Plan Frequency: 1x/week for 90 days    Plan of Care/Certification Expiration Date: 25        Plan of Care/Certification Expiration Date:  Plan of Care/Certification Expiration Date: 25    Frequency/Duration: Plan Frequency: 1x/week for 90 days      Time In/Out:   Time In: 1258  Time Out: 1353      PT Visit Info:    Plan Frequency: 1x/week for 90 days  Progress Note Counter: 1      Visit Count:  1                OUTPATIENT PHYSICAL THERAPY:             Initial Assessment 2025               Episode (prolapse)         Treatment Diagnosis:     Muscle weakness (generalized)  Pelvic floor dysfunction    Medical/Referring Diagnosis:    Uterovaginal prolapse, complete  Weakness of pelvic floor        Referring Physician:  Keyla Gacria DO MD Orders:  PT Eval and Treat   Return MD Appt:  25  Date of Onset:  Onset Date: 22    Allergies:  Patient has no known allergies.  Restrictions/Precautions:    None      Medications Last Reviewed:  2025     SUBJECTIVE   History of Injury/Illness (Reason for Referral):  Ms. Mistry is a 75 yo female referred to pelvic floor physical therapy (PFPT) by Keyla Garcia DO 2/2 Uterovaginal prolapse, complete [N81.3]  Weakness of pelvic floor [N81.89].    Pt reports that her prolapse is interfering with her bladder. She goes frequently during the day and 4+ times at night.   Did PFPT in the past, is not sure why and it was not super helpful.    She does not have a history of DM.  She does not have a history of sleep apnea.    Urinary: Frequency 8 x/day, 4 x/night.  Positive for urinary urgency, urinary frequency, incomplete emptying, urinary hesitancy/intermittency, and nocturia.   Negative for stress

## 2025-02-20 NOTE — PROGRESS NOTES
Beena Christye Fede  : 1950  Primary: Medicare Part A And B (Medicare)  Secondary: [employer] MEDICARE SUPP [facility] [place] [address] [place] [address] [place] [address]  Phone: [phone]  Fax: [phone] Plan Frequency: 1x/week for 90 days  Plan of Care/Certification Expiration Date: 25        Plan of Care/Certification Expiration Date:  Plan of Care/Certification Expiration Date: 25    Frequency/Duration: Plan Frequency: 1x/week for 90 days      Time In/Out:   Time In: 1258  Time Out: 1353      PT Visit Info:    Progress Note Counter: 1      Visit Count:  1    OUTPATIENT PHYSICAL THERAPY:   Treatment Note 2025       Episode  (prolapse)               Treatment Diagnosis:    Muscle weakness (generalized)  Pelvic floor dysfunction  Medical/Referring Diagnosis:    Uterovaginal prolapse, complete  Weakness of pelvic floor      Referring Physician:  Keyla Garcia DO MD Orders:  PT Eval and Treat   Return MD Appt:  25  Date of Onset:  Onset Date: 22     Allergies:   Patient has no known allergies.  Restrictions/Precautions:   None      Interventions Planned (Treatment may consist of any combination of the following):     See Assessment Note    Subjective Comments:   See evaluation note from today  Initial Pain Level::     0/10  Post Session Pain Level:       0/10  Medications Last Reviewed:  2025  Updated Objective Findings:  See Evaluation Note from today  To be assessed next visit   and Range of motion:  LE ROM (Normal ROM) Left Right   Hip flexion (100-120)     Hip extension (15)     Hip internal rotation (30-40)     Hip external rotation (40-50)       SPINE (Normal ROM) Left Right   Lumbar flexion (40-60; greatest at L4-5)     Lumbar extension (20-25)     Lumbar side bending (15-35; greatest at L3-4)     Lumbar rotation (5-20; greatest at L4-S1)       Strength:   Left Right   Hip flexion (L2/3) /5 /5   Hip extension (L4-S1) /5 /5

## 2025-02-26 NOTE — PROGRESS NOTES
today and POC 2/19/25   Bladder health education Educated on recommended water intake, bladder irritants 2/19/25   Urinary urge suppression Educated on how to use this to retrain bladder 2/28/25   The deoack     Voiding strategies     Nocturia tips     Bowel/Bladder log Issued this visit to review next  Reviewed diary and made suggestions for changes 2/19/25 2/28/25   Bowel health education     Constipation care     Diarrhea/Fecal leakage     Colonic massage     Toilet positioning     Defecation dynamics     Sources of fiber     Return to intercourse/Dilator training     Sexual positioning     Lubricants/vaginal moisturizers     Vulvovaginal health/vaginal irritants     Body mechanics Educated on best ways to stand from chairs and decreasing intra-abdominal pressure 2/28/25   Posture/ergonomics     Diaphragmatic breathing Education on how to use this to build awareness of PFMs 2/19/25   Resources and technology     Other patient education Education on prolapse prognosis with PFPT 2/19/25     Chaperone for Intimate Exam  Chaperone was offered as part of the rooming process. Patient declined and agrees to continue with exam without a chaperone.  Chaperone: n/a  Pt gives verbal consent to internal vaginal assessment/treatment.    Treatment/Session Summary:    Treatment Assessment:   Pt presented with continued symptoms of prolapse and muscle weakness. She demonstrated decreased hip strength in all MMTs and functional movements. Session focused on review of bladder diary and beginning PFM strengthening. She demonstrated slightly improved motor coordination of PFMs with diaphragmatic breathing, but decreased strength and endurance. She experienced fatigue during LE exercises, and required the table to be raised for STS w/out UE support.   Communication/Consultation:  None today  Equipment provided today:  HEP  Recommendations/Intent for next treatment session: Next visit will focus on review urge suppression technique,  progress PFM and hip, begin abdominal strengthening..    >Total Treatment Billable Duration:  54 minutes  Time In: 1502  Time Out: 1556    ROOPA JIMENEZ PT, DPT      Charge Capture  Events  [facility] Portal  Appt Desk  Attendance Report     Future Appointments   Date Time Provider Department Center   3/7/2025  1:00 PM Roopa Jimenez, TIANNA [facility]   3/21/2025  1:00 PM Roopa Jimenez PT [facility]   3/28/2025  1:00 PM Roopa Jimenez, PT [facility]   4/4/2025  2:00 PM Roopa Jimenez, PT [facility]   5/21/2025  8:30 AM [facility]   5/22/2025  2:15 PM Keyla Garcia, DO [facility]   5/27/2025  9:20 AM Cheryl Forman, DO [facility]

## 2025-02-28 ENCOUNTER — HOSPITAL ENCOUNTER (OUTPATIENT)
Dept: PHYSICAL THERAPY | Age: 75
Setting detail: RECURRING SERIES
End: 2025-02-28
Attending: OBSTETRICS & GYNECOLOGY
Payer: MEDICARE

## 2025-02-28 PROCEDURE — 97110 THERAPEUTIC EXERCISES: CPT

## 2025-02-28 PROCEDURE — 97530 THERAPEUTIC ACTIVITIES: CPT

## 2025-02-28 ASSESSMENT — PAIN SCALES - GENERAL: PAINLEVEL_OUTOF10: 0

## 2025-03-06 NOTE — PROGRESS NOTES
Beena Del Rosario Fede  : 1950  Primary: Medicare Part A And B (Medicare)  Secondary: [employer] MEDICARE SUPP [facility] [place] [address] [place] [address] [place] [address]  Phone: [phone]  Fax: [phone] Plan Frequency: 1x/week for 90 days  Plan of Care/Certification Expiration Date: 25        Plan of Care/Certification Expiration Date:  Plan of Care/Certification Expiration Date: 25    Frequency/Duration: Plan Frequency: 1x/week for 90 days      Time In/Out:   Time In: 1300  Time Out: 1355      PT Visit Info:    Progress Note Counter: 3      Visit Count:  3    OUTPATIENT PHYSICAL THERAPY:   Treatment Note 3/7/2025       Episode  (prolapse)               Treatment Diagnosis:    Muscle weakness (generalized)  Pelvic floor dysfunction  Medical/Referring Diagnosis:    Uterovaginal prolapse, complete  Weakness of pelvic floor      Referring Physician:  Keyla Garcia DO MD Orders:  PT Eval and Treat   Return MD Appt:  25  Date of Onset:  Onset Date: 22     Allergies:   Patient has no known allergies.  Restrictions/Precautions:   None      Interventions Planned (Treatment may consist of any combination of the following):     See Assessment Note    Subjective Comments:   Pt reports that she is ok, but after and BM, she feels her prolapse more.    Initial Pain Level::     0/10  Post Session Pain Level:       0/10  Medications Last Reviewed:  3/7/2025  Updated Objective Findings:  None Today    Treatment   THERAPEUTIC EXERCISE: (45 minutes): Exercises per grid below to improve mobility.  Required no visual cues to promote proper body alignment.  Progressed resistance as indicated.     Most Recent Treatment:   Date:  3/7/25 Date:  25   Activity/Exercise     PFM Contractions  3x5   Quick Flicks     Long Holds  5x5 sec hold   Elevators with Quick Release     Elevators with Controlled Release     PFM Lengthening (Bulges)  2x3        Cat/Cow

## 2025-03-07 ENCOUNTER — HOSPITAL ENCOUNTER (OUTPATIENT)
Dept: PHYSICAL THERAPY | Age: 75
Setting detail: RECURRING SERIES
Discharge: HOME | End: 2025-03-09
Attending: OBSTETRICS & GYNECOLOGY
Payer: MEDICARE

## 2025-03-07 PROCEDURE — 97530 THERAPEUTIC ACTIVITIES: CPT

## 2025-03-07 PROCEDURE — 97110 THERAPEUTIC EXERCISES: CPT

## 2025-03-07 ASSESSMENT — PAIN SCALES - GENERAL: PAINLEVEL_OUTOF10: 0

## 2025-03-10 ENCOUNTER — APPOINTMENT (OUTPATIENT)
Dept: PHYSICAL THERAPY | Age: 75
End: 2025-03-10
Attending: OBSTETRICS & GYNECOLOGY
Payer: MEDICARE

## 2025-03-20 NOTE — PROGRESS NOTES
Beena Del Rosario Fede  : 1950  Primary: Medicare Part A And B (Medicare)  Secondary: [employer] MEDICARE SUPP [facility] [place] [address] [place] [address] [place] [address]  Phone: [phone]  Fax: [phone] Plan Frequency: 1x/week for 90 days  Plan of Care/Certification Expiration Date: 25        Plan of Care/Certification Expiration Date:  Plan of Care/Certification Expiration Date: 25    Frequency/Duration: Plan Frequency: 1x/week for 90 days      Time In/Out:   Time In: 1302  Time Out: 1356      PT Visit Info:    Progress Note Counter: 4      Visit Count:  4    OUTPATIENT PHYSICAL THERAPY:   Treatment Note 3/21/2025       Episode  (prolapse)               Treatment Diagnosis:    Muscle weakness (generalized)  Pelvic floor dysfunction  Medical/Referring Diagnosis:    Uterovaginal prolapse, complete  Weakness of pelvic floor      Referring Physician:  Keyla Garcia DO MD Orders:  PT Eval and Treat   Return MD Appt:  25  Date of Onset:  Onset Date: 22     Allergies:   Patient has no known allergies.  Restrictions/Precautions:   None      Interventions Planned (Treatment may consist of any combination of the following):     See Assessment Note    Subjective Comments:   Pt reports that she does not notice much of a difference. She was able to do her exercises 3x since last visit.      Initial Pain Level::     0/10  Post Session Pain Level:       0/10  Medications Last Reviewed:  3/21/2025  Updated Objective Findings:  None Today    Treatment   THERAPEUTIC EXERCISE: (35 minutes): Exercises per grid below to improve mobility.  Required no visual cues to promote proper body alignment.  Progressed resistance as indicated.     Most Recent Treatment:   Date:  3/21/25 Date:  3/7/25 Date:  25   Activity/Exercise      PFM Contractions 2x10  1x5  3x5   Quick Flicks      Long Holds   5x5 sec hold   Elevators with Quick Release 2x5     Elevators with

## 2025-03-21 ENCOUNTER — HOSPITAL ENCOUNTER (OUTPATIENT)
Dept: PHYSICAL THERAPY | Age: 75
Setting detail: RECURRING SERIES
Discharge: HOME | End: 2025-03-23
Attending: OBSTETRICS & GYNECOLOGY
Payer: MEDICARE

## 2025-03-21 PROCEDURE — 97110 THERAPEUTIC EXERCISES: CPT

## 2025-03-21 PROCEDURE — 97140 MANUAL THERAPY 1/> REGIONS: CPT

## 2025-03-21 PROCEDURE — 97530 THERAPEUTIC ACTIVITIES: CPT

## 2025-03-21 ASSESSMENT — PAIN SCALES - GENERAL: PAINLEVEL_OUTOF10: 0

## 2025-03-28 ENCOUNTER — HOSPITAL ENCOUNTER (OUTPATIENT)
Dept: PHYSICAL THERAPY | Age: 75
Setting detail: RECURRING SERIES
End: 2025-03-28
Attending: OBSTETRICS & GYNECOLOGY
Payer: MEDICARE

## 2025-04-03 ENCOUNTER — TELEPHONE (OUTPATIENT)
Dept: FAMILY MEDICINE CLINIC | Facility: CLINIC | Age: 75
End: 2025-04-03

## 2025-04-03 NOTE — TELEPHONE ENCOUNTER
Patient called and stated she tested at home and was positive for covid wanting to know what she should do advised to be treated would need to make an appointment with provider. Patient stated she did not want to be treated with Paxlovid and would continue to treat at home is feeling a little better

## 2025-04-10 ENCOUNTER — OFFICE VISIT (OUTPATIENT)
Dept: UROGYNECOLOGY | Age: 75
End: 2025-04-10
Payer: MEDICARE

## 2025-04-10 DIAGNOSIS — N81.3 UTEROVAGINAL PROLAPSE, COMPLETE: Primary | ICD-10-CM

## 2025-04-10 PROCEDURE — 99212 OFFICE O/P EST SF 10 MIN: CPT | Performed by: OBSTETRICS & GYNECOLOGY

## 2025-04-10 PROCEDURE — G8419 CALC BMI OUT NRM PARAM NOF/U: HCPCS | Performed by: OBSTETRICS & GYNECOLOGY

## 2025-04-10 PROCEDURE — 1090F PRES/ABSN URINE INCON ASSESS: CPT | Performed by: OBSTETRICS & GYNECOLOGY

## 2025-04-10 PROCEDURE — 3017F COLORECTAL CA SCREEN DOC REV: CPT | Performed by: OBSTETRICS & GYNECOLOGY

## 2025-04-10 PROCEDURE — 1159F MED LIST DOCD IN RCRD: CPT | Performed by: OBSTETRICS & GYNECOLOGY

## 2025-04-10 PROCEDURE — 1123F ACP DISCUSS/DSCN MKR DOCD: CPT | Performed by: OBSTETRICS & GYNECOLOGY

## 2025-04-10 PROCEDURE — 1036F TOBACCO NON-USER: CPT | Performed by: OBSTETRICS & GYNECOLOGY

## 2025-04-10 PROCEDURE — G8399 PT W/DXA RESULTS DOCUMENT: HCPCS | Performed by: OBSTETRICS & GYNECOLOGY

## 2025-04-10 PROCEDURE — G8427 DOCREV CUR MEDS BY ELIG CLIN: HCPCS | Performed by: OBSTETRICS & GYNECOLOGY

## 2025-04-10 NOTE — PROGRESS NOTES
[facility]  [address]  [address]  [facility] [address]  Dept: [phone]    PCP:  Cheryl Forman DO    4/10/2025      HPI:  Beena Mistry is here to follow up on Follow-up  .    Patient is here for POP discussion, she did attend pelvic floor therapy with Gini, she felt there was no change, and want to discuss surgery options and even pessary.    No results found for this visit on 04/10/25.    There were no vitals taken for this visit.                 2/12/2025     1:00 PM   Pelvic Organ Prolapse Quantification   Anterior Wall (Aa) 3   Anterior Wall (Ba) 3   Cervix or Cuff (C) 3   Genital Haitus (gh) 2   Perineal Body (pb) 2   Total Vaginal Length (tvl) 8   Posterior Wall (Ap) -2   Posterior Wall (Bp) -2   Posterior Fornix (D) -7          1. Uterovaginal prolapse, complete  Assessment & Plan:  She is here today with her - We had a long discussion on her surgical options. We also spoke about the details of the pessary.     We spoke in detail about all of her options. I will give her info on pessary and her surgical options as well.     If she would like to try a pessary, we will schedule a pessary fitting.   If she would like to go the surgical route, we will schedule UDS and Pre Op     No follow-ups on file.          Keyla Gacria DO

## 2025-04-10 NOTE — ASSESSMENT & PLAN NOTE
She is here today with her - We had a long discussion on her surgical options. We also spoke about the details of the pessary.     We spoke in detail about all of her options. I will give her info on pessary and her surgical options as well.     If she would like to try a pessary, we will schedule a pessary fitting.   If she would like to go the surgical route, we will schedule UDS and Pre Op

## 2025-05-18 DIAGNOSIS — E03.9 PRIMARY HYPOTHYROIDISM: ICD-10-CM

## 2025-05-21 ENCOUNTER — LAB (OUTPATIENT)
Dept: FAMILY MEDICINE CLINIC | Facility: CLINIC | Age: 75
End: 2025-05-21

## 2025-05-21 DIAGNOSIS — E78.00 PURE HYPERCHOLESTEROLEMIA, UNSPECIFIED: ICD-10-CM

## 2025-05-21 DIAGNOSIS — E03.9 PRIMARY HYPOTHYROIDISM: ICD-10-CM

## 2025-05-21 LAB
ALBUMIN SERPL-MCNC: 3.9 G/DL (ref 3.2–4.6)
ALBUMIN/GLOB SERPL: 1.4 (ref 1–1.9)
ALP SERPL-CCNC: 64 U/L (ref 35–104)
ALT SERPL-CCNC: 21 U/L (ref 8–45)
ANION GAP SERPL CALC-SCNC: 11 MMOL/L (ref 7–16)
AST SERPL-CCNC: 22 U/L (ref 15–37)
BASOPHILS # BLD: 0.02 K/UL (ref 0–0.2)
BASOPHILS NFR BLD: 0.6 % (ref 0–2)
BILIRUB SERPL-MCNC: 0.4 MG/DL (ref 0–1.2)
BUN SERPL-MCNC: 9 MG/DL (ref 8–23)
CALCIUM SERPL-MCNC: 9 MG/DL (ref 8.8–10.2)
CHLORIDE SERPL-SCNC: 100 MMOL/L (ref 98–107)
CHOLEST SERPL-MCNC: 236 MG/DL (ref 0–200)
CO2 SERPL-SCNC: 24 MMOL/L (ref 20–29)
CREAT SERPL-MCNC: 0.55 MG/DL (ref 0.6–1.1)
DIFFERENTIAL METHOD BLD: ABNORMAL
EOSINOPHIL # BLD: 0.08 K/UL (ref 0–0.8)
EOSINOPHIL NFR BLD: 2.3 % (ref 0.5–7.8)
ERYTHROCYTE [DISTWIDTH] IN BLOOD BY AUTOMATED COUNT: 15.2 % (ref 11.9–14.6)
GLOBULIN SER CALC-MCNC: 2.8 G/DL (ref 2.3–3.5)
GLUCOSE SERPL-MCNC: 88 MG/DL (ref 70–99)
HCT VFR BLD AUTO: 34.4 % (ref 35.8–46.3)
HDLC SERPL-MCNC: 86 MG/DL (ref 40–60)
HDLC SERPL: 2.8 (ref 0–5)
HGB BLD-MCNC: 11.1 G/DL (ref 11.7–15.4)
IMM GRANULOCYTES # BLD AUTO: 0 K/UL (ref 0–0.5)
IMM GRANULOCYTES NFR BLD AUTO: 0 % (ref 0–5)
LDLC SERPL CALC-MCNC: 140 MG/DL (ref 0–100)
LYMPHOCYTES # BLD: 1.85 K/UL (ref 0.5–4.6)
LYMPHOCYTES NFR BLD: 52.6 % (ref 13–44)
MCH RBC QN AUTO: 29.1 PG (ref 26.1–32.9)
MCHC RBC AUTO-ENTMCNC: 32.3 G/DL (ref 31.4–35)
MCV RBC AUTO: 90.1 FL (ref 82–102)
MONOCYTES # BLD: 0.31 K/UL (ref 0.1–1.3)
MONOCYTES NFR BLD: 8.8 % (ref 4–12)
NEUTS SEG # BLD: 1.26 K/UL (ref 1.7–8.2)
NEUTS SEG NFR BLD: 35.7 % (ref 43–78)
NRBC # BLD: 0 K/UL (ref 0–0.2)
PLATELET # BLD AUTO: 287 K/UL (ref 150–450)
PMV BLD AUTO: 10 FL (ref 9.4–12.3)
POTASSIUM SERPL-SCNC: 4.2 MMOL/L (ref 3.5–5.1)
PROT SERPL-MCNC: 6.7 G/DL (ref 6.3–8.2)
RBC # BLD AUTO: 3.82 M/UL (ref 4.05–5.2)
SODIUM SERPL-SCNC: 136 MMOL/L (ref 136–145)
TRIGL SERPL-MCNC: 54 MG/DL (ref 0–150)
TSH, 3RD GENERATION: 2.32 UIU/ML (ref 0.27–4.2)
VLDLC SERPL CALC-MCNC: 11 MG/DL (ref 6–23)
WBC # BLD AUTO: 3.5 K/UL (ref 4.3–11.1)

## 2025-05-24 SDOH — ECONOMIC STABILITY: INCOME INSECURITY: IN THE LAST 12 MONTHS, WAS THERE A TIME WHEN YOU WERE NOT ABLE TO PAY THE MORTGAGE OR RENT ON TIME?: NO

## 2025-05-24 SDOH — ECONOMIC STABILITY: FOOD INSECURITY: WITHIN THE PAST 12 MONTHS, YOU WORRIED THAT YOUR FOOD WOULD RUN OUT BEFORE YOU GOT MONEY TO BUY MORE.: NEVER TRUE

## 2025-05-24 SDOH — ECONOMIC STABILITY: FOOD INSECURITY: WITHIN THE PAST 12 MONTHS, THE FOOD YOU BOUGHT JUST DIDN'T LAST AND YOU DIDN'T HAVE MONEY TO GET MORE.: NEVER TRUE

## 2025-05-24 SDOH — ECONOMIC STABILITY: TRANSPORTATION INSECURITY
IN THE PAST 12 MONTHS, HAS THE LACK OF TRANSPORTATION KEPT YOU FROM MEDICAL APPOINTMENTS OR FROM GETTING MEDICATIONS?: NO

## 2025-05-24 ASSESSMENT — PATIENT HEALTH QUESTIONNAIRE - PHQ9
SUM OF ALL RESPONSES TO PHQ QUESTIONS 1-9: 0
SUM OF ALL RESPONSES TO PHQ9 QUESTIONS 1 & 2: 0
SUM OF ALL RESPONSES TO PHQ QUESTIONS 1-9: 0
1. LITTLE INTEREST OR PLEASURE IN DOING THINGS: NOT AT ALL
2. FEELING DOWN, DEPRESSED OR HOPELESS: NOT AT ALL
SUM OF ALL RESPONSES TO PHQ QUESTIONS 1-9: 0
2. FEELING DOWN, DEPRESSED OR HOPELESS: NOT AT ALL
SUM OF ALL RESPONSES TO PHQ QUESTIONS 1-9: 0
1. LITTLE INTEREST OR PLEASURE IN DOING THINGS: NOT AT ALL

## 2025-05-27 ENCOUNTER — RESULTS FOLLOW-UP (OUTPATIENT)
Dept: FAMILY MEDICINE CLINIC | Facility: CLINIC | Age: 75
End: 2025-05-27

## 2025-05-27 ENCOUNTER — OFFICE VISIT (OUTPATIENT)
Dept: FAMILY MEDICINE CLINIC | Facility: CLINIC | Age: 75
End: 2025-05-27
Payer: MEDICARE

## 2025-05-27 VITALS
OXYGEN SATURATION: 99 % | SYSTOLIC BLOOD PRESSURE: 124 MMHG | BODY MASS INDEX: 25.33 KG/M2 | HEART RATE: 77 BPM | HEIGHT: 65 IN | DIASTOLIC BLOOD PRESSURE: 70 MMHG | WEIGHT: 152 LBS

## 2025-05-27 DIAGNOSIS — Z00.00 MEDICARE ANNUAL WELLNESS VISIT, SUBSEQUENT: Primary | ICD-10-CM

## 2025-05-27 DIAGNOSIS — G47.9 SLEEP DIFFICULTIES: ICD-10-CM

## 2025-05-27 DIAGNOSIS — E03.9 PRIMARY HYPOTHYROIDISM: ICD-10-CM

## 2025-05-27 DIAGNOSIS — D64.9 ANEMIA, UNSPECIFIED TYPE: ICD-10-CM

## 2025-05-27 DIAGNOSIS — E78.00 PURE HYPERCHOLESTEROLEMIA, UNSPECIFIED: ICD-10-CM

## 2025-05-27 DIAGNOSIS — D70.9 NEUTROPENIA, UNSPECIFIED TYPE: ICD-10-CM

## 2025-05-27 PROCEDURE — G2211 COMPLEX E/M VISIT ADD ON: HCPCS | Performed by: FAMILY MEDICINE

## 2025-05-27 PROCEDURE — 1036F TOBACCO NON-USER: CPT | Performed by: FAMILY MEDICINE

## 2025-05-27 PROCEDURE — 1090F PRES/ABSN URINE INCON ASSESS: CPT | Performed by: FAMILY MEDICINE

## 2025-05-27 PROCEDURE — 1159F MED LIST DOCD IN RCRD: CPT | Performed by: FAMILY MEDICINE

## 2025-05-27 PROCEDURE — 3017F COLORECTAL CA SCREEN DOC REV: CPT | Performed by: FAMILY MEDICINE

## 2025-05-27 PROCEDURE — 99214 OFFICE O/P EST MOD 30 MIN: CPT | Performed by: FAMILY MEDICINE

## 2025-05-27 PROCEDURE — 1123F ACP DISCUSS/DSCN MKR DOCD: CPT | Performed by: FAMILY MEDICINE

## 2025-05-27 PROCEDURE — 1160F RVW MEDS BY RX/DR IN RCRD: CPT | Performed by: FAMILY MEDICINE

## 2025-05-27 PROCEDURE — G8419 CALC BMI OUT NRM PARAM NOF/U: HCPCS | Performed by: FAMILY MEDICINE

## 2025-05-27 PROCEDURE — G8399 PT W/DXA RESULTS DOCUMENT: HCPCS | Performed by: FAMILY MEDICINE

## 2025-05-27 PROCEDURE — G8427 DOCREV CUR MEDS BY ELIG CLIN: HCPCS | Performed by: FAMILY MEDICINE

## 2025-05-27 PROCEDURE — G0439 PPPS, SUBSEQ VISIT: HCPCS | Performed by: FAMILY MEDICINE

## 2025-05-27 RX ORDER — LIOTHYRONINE SODIUM 5 UG/1
10 TABLET ORAL DAILY
Qty: 180 TABLET | Refills: 1 | Status: SHIPPED | OUTPATIENT
Start: 2025-05-27

## 2025-05-27 RX ORDER — LEVOTHYROXINE SODIUM 50 UG/1
50 TABLET ORAL
Qty: 90 TABLET | Refills: 1 | Status: SHIPPED | OUTPATIENT
Start: 2025-05-27

## 2025-05-27 ASSESSMENT — ENCOUNTER SYMPTOMS
VOMITING: 0
BLOOD IN STOOL: 0
NAUSEA: 0
COUGH: 0
ABDOMINAL PAIN: 0
SHORTNESS OF BREATH: 0

## 2025-05-27 ASSESSMENT — LIFESTYLE VARIABLES
HOW MANY STANDARD DRINKS CONTAINING ALCOHOL DO YOU HAVE ON A TYPICAL DAY: PATIENT DOES NOT DRINK
HOW OFTEN DO YOU HAVE A DRINK CONTAINING ALCOHOL: NEVER

## 2025-05-27 NOTE — PROGRESS NOTES
[facility]  Cheryl Gonzalez Dickson, DO  [address] [place] [address]  Ph No:  [phone]  Fax:  [phone]          Assessment & Plan  Medicare annual wellness visit.  This is a subsequent encounter. The attached form provides further details.    Primary hypothyroidism.  Upon reviewing the laboratory results, it is evident that the TSH levels are within the therapeutic range. She will continue with the current regimen of levothyroxine 50 mcg daily and liothyronine 5 mcg, 2 tablets daily.    Neutropenia.  The condition has remained stable for several years.    Anemia.  The condition has remained stable for several years.    Pure hyperlipidemia.  She has previously declined the use of statins. Despite this, her condition has remained stable compared to previous assessments. She will continue with a healthy diet and has been encouraged to increase her physical activity.    Sleep difficulties.  Strategies for improving sleep hygiene and managing stress, which is currently impacting her sleep, were discussed. Magnesium glycinate 200 to 400 mg around bedtime was recommended to help with relaxation and sleep. Potential side effects, such as looser stools and cramping, were discussed.    Ureterovaginal prolapse.  She will be getting a fitting for a pessary on 06/11/2025. If the pessary works, it may help with her sleep interruption caused by the inability to empty her bladder due to the prolapse.    Elevated BMI (25-25.9).  She was advised to start an exercise regimen combining strength training and aerobics, aiming for 30 to 45 minutes a day, 6 days a week. Limiting junk food, processed food, candies, cookies, chips, and caffeine was recommended. Relaxation techniques such as avoiding screens an hour before bedtime, listening to calming music, reading a book, meditating, or taking a warm bath were discussed.    Follow-up  The patient will follow up in 6 months.        Labs:  No results

## 2025-05-27 NOTE — PROGRESS NOTES
Medicare Annual Wellness Visit    Beena Mistry is here for Hypothyroidism and Medicare AWV    Assessment & Plan   Medicare annual wellness visit, subsequent  Primary hypothyroidism  Neutropenia, unspecified type  Anemia, unspecified type  Pure hypercholesterolemia, unspecified  Sleep difficulties  BMI 25.0-25.9,adult     Return in about 6 months (around 11/27/2025) for Thyroid, chol-labs prior .     Subjective       Patient's complete Health Risk Assessment and screening values have been reviewed and are found in Flowsheets. The following problems were reviewed today and where indicated follow up appointments were made and/or referrals ordered.    Positive Risk Factor Screenings with Interventions:              Inactivity:  On average, how many days per week do you engage in moderate to strenuous exercise (like a brisk walk)?: 0 days (!) Abnormal  On average, how many minutes do you engage in exercise at this level?: 0 min  Interventions:  See AVS for additional education material                         Objective   Vitals:    05/27/25 0912   BP: 124/70   BP Site: Left Upper Arm   Patient Position: Sitting   Pulse: 77   SpO2: 99%   Weight: 68.9 kg (152 lb)   Height: 1.651 m (5' 5\")      Body mass index is 25.29 kg/m².                  No Known Allergies  Prior to Visit Medications    Medication Sig Taking? Authorizing Provider   SUMAtriptan (IMITREX) 50 MG tablet Take 1 tablet by mouth Yes Shorty Woods MD   Probiotic Product (PROBIOTIC-10 PO) Take by mouth Yes Shorty Woods MD   Cobalamin Combinations (B-12) 100-5000 MCG SUBL Place under the tongue daily Yes Shorty Woods MD   vitamin C (ASCORBIC ACID) 500 MG tablet Take 1 tablet by mouth daily Yes Shorty Woods MD   LYCOPENE PO Take by mouth as needed Yes Automatic Reconciliation, Ar   vitamin D3 (CHOLECALCIFEROL) 125 MCG (5000 UT) TABS tablet Take by mouth daily Yes Automatic Reconciliation, Ar   Coenzyme Q10 10 MG CAPS Take

## 2025-05-27 NOTE — PATIENT INSTRUCTIONS
Learning About Being Active as an Older Adult  Why is being active important as you get older?     Being active is one of the best things you can do for your health. And it's never too late to start. Being active--or getting active, if you aren't already--has definite benefits. It can:  Give you more energy,  Keep your mind sharp.  Improve balance to reduce your risk of falls.  Help you manage chronic illness with fewer medicines.  No matter how old you are, how fit you are, or what health problems you have, there is a form of activity that will work for you. And the more physical activity you can do, the better your overall health will be.  What kinds of activity can help you stay healthy?  Being more active will make your daily activities easier. Physical activity includes planned exercise and things you do in daily life. There are four types of activity:  Aerobic.  Doing aerobic activity makes your heart and lungs strong.  Includes walking, dancing, and gardening.  Aim for at least 2½ hours spread throughout the week.  It improves your energy and can help you sleep better.  Muscle-strengthening.  This type of activity can help maintain muscle and strengthen bones.  Includes climbing stairs, using resistance bands, and lifting or carrying heavy loads.  Aim for at least twice a week.  It can help protect the knees and other joints.  Stretching.  Stretching gives you better range of motion in joints and muscles.  Includes upper arm stretches, calf stretches, and gentle yoga.  Aim for at least twice a week, preferably after your muscles are warmed up from other activities.  It can help you function better in daily life.  Balancing.  This helps you stay coordinated and have good posture.  Includes heel-to-toe walking, raquel chi, and certain types of yoga.  Aim for at least 3 days a week.  It can reduce your risk of falling.  Even if you have a hard time meeting the recommendations, it's better to be more active  If you continue to have diarrhea, you can take Imodium, which can be purchased over the counter at a local pharmacy.

## 2025-06-11 ENCOUNTER — OFFICE VISIT (OUTPATIENT)
Dept: UROGYNECOLOGY | Age: 75
End: 2025-06-11
Payer: MEDICARE

## 2025-06-11 DIAGNOSIS — N81.3 UTEROVAGINAL PROLAPSE, COMPLETE: Primary | ICD-10-CM

## 2025-06-11 PROCEDURE — 99459 PELVIC EXAMINATION: CPT | Performed by: OBSTETRICS & GYNECOLOGY

## 2025-06-11 PROCEDURE — 1159F MED LIST DOCD IN RCRD: CPT | Performed by: OBSTETRICS & GYNECOLOGY

## 2025-06-11 PROCEDURE — 99215 OFFICE O/P EST HI 40 MIN: CPT | Performed by: OBSTETRICS & GYNECOLOGY

## 2025-06-11 PROCEDURE — 1123F ACP DISCUSS/DSCN MKR DOCD: CPT | Performed by: OBSTETRICS & GYNECOLOGY

## 2025-06-11 PROCEDURE — 1090F PRES/ABSN URINE INCON ASSESS: CPT | Performed by: OBSTETRICS & GYNECOLOGY

## 2025-06-11 PROCEDURE — 1036F TOBACCO NON-USER: CPT | Performed by: OBSTETRICS & GYNECOLOGY

## 2025-06-11 PROCEDURE — G8419 CALC BMI OUT NRM PARAM NOF/U: HCPCS | Performed by: OBSTETRICS & GYNECOLOGY

## 2025-06-11 PROCEDURE — 3017F COLORECTAL CA SCREEN DOC REV: CPT | Performed by: OBSTETRICS & GYNECOLOGY

## 2025-06-11 PROCEDURE — G8399 PT W/DXA RESULTS DOCUMENT: HCPCS | Performed by: OBSTETRICS & GYNECOLOGY

## 2025-06-11 PROCEDURE — G8427 DOCREV CUR MEDS BY ELIG CLIN: HCPCS | Performed by: OBSTETRICS & GYNECOLOGY

## 2025-06-11 NOTE — PROGRESS NOTES
Cheryl Dewey DO    6/11/2025    Chief Complaint   Patient presents with    Pessary Fitting           HPI: Beena Mistry is here today for a pessary fitting.        Current Outpatient Medications:     levothyroxine (SYNTHROID) 50 MCG tablet, TAKE 1 TABLET BY MOUTH EVERY DAY BEFORE BREAKFAST, Disp: 90 tablet, Rfl: 1    liothyronine (CYTOMEL) 5 MCG tablet, TAKE 2 TABLETS BY MOUTH EVERY DAY, Disp: 180 tablet, Rfl: 1    SUMAtriptan (IMITREX) 50 MG tablet, Take 1 tablet by mouth, Disp: , Rfl:     Probiotic Product (PROBIOTIC-10 PO), Take by mouth, Disp: , Rfl:     Cobalamin Combinations (B-12) 100-5000 MCG SUBL, Place under the tongue daily, Disp: , Rfl:     vitamin C (ASCORBIC ACID) 500 MG tablet, Take 1 tablet by mouth daily, Disp: , Rfl:     LYCOPENE PO, Take by mouth as needed, Disp: , Rfl:     vitamin D3 (CHOLECALCIFEROL) 125 MCG (5000 UT) TABS tablet, Take by mouth daily, Disp: , Rfl:     Coenzyme Q10 10 MG CAPS, Take by mouth, Disp: , Rfl:     magnesium oxide (MAG-OX) 400 (240 Mg) MG tablet, Take 1 tablet by mouth daily, Disp: , Rfl:     melatonin 3 MG TABS tablet, Take by mouth, Disp: , Rfl:     vitamin E 400 UNIT capsule, Take by mouth daily, Disp: , Rfl:     No Known Allergies    Past Medical History:   Diagnosis Date    Adenocarcinoma of duodenum (HCC) 2010    S/P whipple    Anemia 1990    Don’t recall first noted    Kidney stone 1974    Lichen sclerosus     OAB (overactive bladder)     Osteoporosis     Primary hypothyroidism        OB History   No obstetric history on file.       Past Surgical History:   Procedure Laterality Date    HEMORRHOID SURGERY  2020    OTHER SURGICAL HISTORY  2010    whipple procedure     SMALL INTESTINE SURGERY  2010    Whipple Procedure       Family History   Problem Relation Age of Onset    Heart Disease Mother        Social History     Socioeconomic History    Marital status:    Tobacco Use    Smoking status: Never    Smokeless tobacco: Never   Vaping Use

## 2025-06-18 ENCOUNTER — OFFICE VISIT (OUTPATIENT)
Dept: UROGYNECOLOGY | Age: 75
End: 2025-06-18
Payer: MEDICARE

## 2025-06-18 DIAGNOSIS — N81.3 UTEROVAGINAL PROLAPSE, COMPLETE: Primary | ICD-10-CM

## 2025-06-18 PROCEDURE — 99212 OFFICE O/P EST SF 10 MIN: CPT | Performed by: OBSTETRICS & GYNECOLOGY

## 2025-06-18 PROCEDURE — 1123F ACP DISCUSS/DSCN MKR DOCD: CPT | Performed by: OBSTETRICS & GYNECOLOGY

## 2025-06-18 PROCEDURE — G8419 CALC BMI OUT NRM PARAM NOF/U: HCPCS | Performed by: OBSTETRICS & GYNECOLOGY

## 2025-06-18 PROCEDURE — 1090F PRES/ABSN URINE INCON ASSESS: CPT | Performed by: OBSTETRICS & GYNECOLOGY

## 2025-06-18 PROCEDURE — A4562 PESSARY, NON RUBBER,ANY TYPE: HCPCS | Performed by: OBSTETRICS & GYNECOLOGY

## 2025-06-18 PROCEDURE — 1036F TOBACCO NON-USER: CPT | Performed by: OBSTETRICS & GYNECOLOGY

## 2025-06-18 PROCEDURE — G8399 PT W/DXA RESULTS DOCUMENT: HCPCS | Performed by: OBSTETRICS & GYNECOLOGY

## 2025-06-18 PROCEDURE — 3017F COLORECTAL CA SCREEN DOC REV: CPT | Performed by: OBSTETRICS & GYNECOLOGY

## 2025-06-18 PROCEDURE — G8428 CUR MEDS NOT DOCUMENT: HCPCS | Performed by: OBSTETRICS & GYNECOLOGY

## 2025-06-18 NOTE — PROGRESS NOTES
[facility]  [address]  [address]  [place] [id number]  Dept: [phone]        PCP:  Cheryl Forman DO    6/18/2025    Chief Complaint   Patient presents with    Pessary Placement       HPI: Beena Mistry is here today for a pessary placement.        Current Outpatient Medications:     levothyroxine (SYNTHROID) 50 MCG tablet, TAKE 1 TABLET BY MOUTH EVERY DAY BEFORE BREAKFAST, Disp: 90 tablet, Rfl: 1    liothyronine (CYTOMEL) 5 MCG tablet, TAKE 2 TABLETS BY MOUTH EVERY DAY, Disp: 180 tablet, Rfl: 1    SUMAtriptan (IMITREX) 50 MG tablet, Take 1 tablet by mouth, Disp: , Rfl:     Probiotic Product (PROBIOTIC-10 PO), Take by mouth, Disp: , Rfl:     Cobalamin Combinations (B-12) 100-5000 MCG SUBL, Place under the tongue daily, Disp: , Rfl:     vitamin C (ASCORBIC ACID) 500 MG tablet, Take 1 tablet by mouth daily, Disp: , Rfl:     LYCOPENE PO, Take by mouth as needed, Disp: , Rfl:     vitamin D3 (CHOLECALCIFEROL) 125 MCG (5000 UT) TABS tablet, Take by mouth daily, Disp: , Rfl:     Coenzyme Q10 10 MG CAPS, Take by mouth, Disp: , Rfl:     magnesium oxide (MAG-OX) 400 (240 Mg) MG tablet, Take 1 tablet by mouth daily, Disp: , Rfl:     melatonin 3 MG TABS tablet, Take by mouth, Disp: , Rfl:     vitamin E 400 UNIT capsule, Take by mouth daily, Disp: , Rfl:     No Known Allergies    Past Medical History:   Diagnosis Date    Adenocarcinoma of duodenum (HCC) 2010    S/P whipple    Anemia 1990    Don’t recall first noted    Kidney stone 1974    Lichen sclerosus     OAB (overactive bladder)     Osteoporosis     Primary hypothyroidism        OB History   No obstetric history on file.       Past Surgical History:   Procedure Laterality Date    HEMORRHOID SURGERY  2020    OTHER SURGICAL HISTORY  2010    whipple procedure     SMALL INTESTINE SURGERY  2010    Whipple Procedure       Family History   Problem Relation Age of Onset    Heart Disease Mother        Social History     Socioeconomic

## 2025-06-18 NOTE — ASSESSMENT & PLAN NOTE
Insertion     You will be taught how to insert and remove your pessary. Pessaries vary in size and shape and the instruction will depend on your particular type of pessary. Many women with ring pessaries choose to learn how to remove and reinsert. The following instructions refer to this pessary.   Wash your hands and the pessary with soap and water.   2. Rinse and dry thoroughly.   3. Fold the pessary in half. It will only fold one way (at the indentation areas within the ring.) If you are right handed and choose to insert the pessary standing up, place your left foot up on a chair, low stool, or toilet. Lean over this leg. Insert the folded pessary into the vagina (long ways) as far back as you can. It will open up into its normal shape when you let go on the ring. Use your index finger to make sure the rim is behind the pubic bone. If you are left handed, place your right foot up on a chair, low stool, or toilet. Use your left hand to manage the insertion of the pessary as stated above. Some women prefer to insert the pessary lying down. If so, lay down in bed. Bend your knees. Hold pessary as stated above and insert. You may also choose to insert the pessary by sitting at the edge of a chair.     Removal   Use the same position as with the insertion. Insert index finger into the vagina and find the rim of the pessary. Hook finger under rim. Pull down and out. The ring will not fold up completely as with insertion, but the vaginal walls will stretch to allow removal.    Care of the pessary   If you are able to care for your pessary at home, we typically recommend that you take it out and clean it daily or at least once per week. You should use a mild soap with water, rinse and dry it completely, and reinsert it into the vagina the next morning.     Most pessaries last for several years.   It is not uncommon for the pessary to fall out when you are having a bowel movement. Check the toilet before you flush. If the

## 2025-07-08 NOTE — PATIENT INSTRUCTIONS
Patient Education        Learning About the Mediterranean Diet  What is the [id number] [facility]? The Mediterranean diet is a style of eating rather than a diet plan. It features foods eaten in [place], [place], [place] and [place], and other countries along the [facility]. It emphasizes eating foods like fish, fruits, vegetables, beans, high-fiber breads and whole grains, nuts, and olive oil. This style of eating includes limited red meat, cheese, and sweets. Why choose the Mediterranean diet? A Mediterranean-style diet may improve heart health. It contains more fat than other heart-healthy diets. But the fats are mainly from nuts, unsaturated oils (such as fish oils and olive oil), and certain nut or seed oils (such as canola, soybean, or flaxseed oil). These fats may help protect the heart and blood vessels. How can you get started on the Mediterranean diet? Here are some things you can do to switch to a more Mediterranean way of eating. What to eat  Eat a variety of fruits and vegetables each day, such as grapes, blueberries, tomatoes, broccoli, peppers, figs, olives, spinach, eggplant, beans, lentils, and chickpeas. Eat a variety of whole-grain foods each day, such as oats, brown rice, and whole wheat bread, pasta, and couscous. Eat fish at least 2 times a week. Try tuna, salmon, mackerel, lake trout, herring, or sardines. Eat moderate amounts of low-fat dairy products, such as milk, cheese, or yogurt. Eat moderate amounts of poultry and eggs. Choose healthy (unsaturated) fats, such as nuts, olive oil, and certain nut or seed oils like canola, soybean, and flaxseed. Limit unhealthy (saturated) fats, such as butter, palm oil, and coconut oil. And limit fats found in animal products, such as meat and dairy products made with whole milk. Try to eat red meat only a few times a month in very small amounts. Limit sweets and desserts to only a few times a week.  This includes sugar-sweetened drinks like soda. The Mediterranean diet may also include red wine with your meal--1 glass each day for women and up to 2 glasses a day for men. Tips for eating at home  Use herbs, spices, garlic, lemon zest, and citrus juice instead of salt to add flavor to foods. Add avocado slices to your sandwich instead of mejia. Have fish for lunch or dinner instead of red meat. Brush the fish with olive oil, and broil or grill it. Sprinkle your salad with seeds or nuts instead of cheese. Cook with olive or canola oil instead of butter or oils that are high in saturated fat. Switch from 2% milk or whole milk to 1% or fat-free milk. Dip raw vegetables in a vinaigrette dressing or hummus instead of dips made from mayonnaise or sour cream.  Have a piece of fruit for dessert instead of a piece of cake. Try baked apples, or have some dried fruit. Tips for eating out  Try broiled, grilled, baked, or poached fish instead of having it fried or breaded. Ask your  to have your meals prepared with olive oil instead of butter. Order dishes made with marinara sauce or sauces made from olive oil. Avoid sauces made from cream or mayonnaise. Choose whole-grain breads, whole wheat pasta and pizza crust, brown rice, beans, and lentils. Cut back on butter or margarine on bread. Instead, you can dip your bread in a small amount of olive oil. Ask for a side salad or grilled vegetables instead of french fries or chips. Where can you learn more? Go to https://[employer]. org and sign in to your [employer] account. Enter [phone] in the [facility] box to learn more about \"Learning About the Mediterranean Diet. \"     If you do not have an account, please click on the \"Sign Up Now\" link. Current as of: May 9, 2022               Content Version: 13.4  © 1381-9280 [employer]. Care instructions adapted under license by [facility] ([facility]).  If you have questions about a medical condition or this instruction, always ask your healthcare professional. [place] [address] any warranty or liability for your use of this information. show